# Patient Record
Sex: FEMALE | Race: WHITE | NOT HISPANIC OR LATINO | URBAN - METROPOLITAN AREA
[De-identification: names, ages, dates, MRNs, and addresses within clinical notes are randomized per-mention and may not be internally consistent; named-entity substitution may affect disease eponyms.]

---

## 2017-02-06 ENCOUNTER — OFFICE (AMBULATORY)
Dept: URBAN - METROPOLITAN AREA CLINIC 75 | Facility: CLINIC | Age: 62
End: 2017-02-06

## 2017-02-06 VITALS
DIASTOLIC BLOOD PRESSURE: 68 MMHG | HEIGHT: 62 IN | WEIGHT: 147 LBS | HEART RATE: 86 BPM | SYSTOLIC BLOOD PRESSURE: 104 MMHG

## 2017-02-06 DIAGNOSIS — K51.00 ULCERATIVE (CHRONIC) PANCOLITIS WITHOUT COMPLICATIONS: ICD-10-CM

## 2017-02-06 DIAGNOSIS — Z83.71 FAMILY HISTORY OF COLONIC POLYPS: ICD-10-CM

## 2017-02-06 DIAGNOSIS — K21.9 GASTRO-ESOPHAGEAL REFLUX DISEASE WITHOUT ESOPHAGITIS: ICD-10-CM

## 2017-02-06 DIAGNOSIS — K58.0 IRRITABLE BOWEL SYNDROME WITH DIARRHEA: ICD-10-CM

## 2017-02-06 DIAGNOSIS — K57.30 DIVERTICULOSIS OF LARGE INTESTINE WITHOUT PERFORATION OR ABS: ICD-10-CM

## 2017-02-06 DIAGNOSIS — Z80.0 FAMILY HISTORY OF MALIGNANT NEOPLASM OF DIGESTIVE ORGANS: ICD-10-CM

## 2017-02-06 PROCEDURE — 99214 OFFICE O/P EST MOD 30 MIN: CPT | Performed by: INTERNAL MEDICINE

## 2017-06-26 ENCOUNTER — OFFICE (AMBULATORY)
Dept: URBAN - METROPOLITAN AREA CLINIC 75 | Facility: CLINIC | Age: 62
End: 2017-06-26

## 2017-06-26 VITALS
WEIGHT: 151 LBS | SYSTOLIC BLOOD PRESSURE: 116 MMHG | HEIGHT: 62 IN | HEART RATE: 100 BPM | DIASTOLIC BLOOD PRESSURE: 72 MMHG

## 2017-06-26 DIAGNOSIS — K57.30 DIVERTICULOSIS OF LARGE INTESTINE WITHOUT PERFORATION OR ABS: ICD-10-CM

## 2017-06-26 DIAGNOSIS — Z83.71 FAMILY HISTORY OF COLONIC POLYPS: ICD-10-CM

## 2017-06-26 DIAGNOSIS — K51.00 ULCERATIVE (CHRONIC) PANCOLITIS WITHOUT COMPLICATIONS: ICD-10-CM

## 2017-06-26 DIAGNOSIS — K21.9 GASTRO-ESOPHAGEAL REFLUX DISEASE WITHOUT ESOPHAGITIS: ICD-10-CM

## 2017-06-26 DIAGNOSIS — K58.0 IRRITABLE BOWEL SYNDROME WITH DIARRHEA: ICD-10-CM

## 2017-06-26 DIAGNOSIS — Z80.0 FAMILY HISTORY OF MALIGNANT NEOPLASM OF DIGESTIVE ORGANS: ICD-10-CM

## 2017-06-26 DIAGNOSIS — R19.4 CHANGE IN BOWEL HABIT: ICD-10-CM

## 2017-06-26 PROCEDURE — 99214 OFFICE O/P EST MOD 30 MIN: CPT | Performed by: INTERNAL MEDICINE

## 2017-12-07 ENCOUNTER — OFFICE VISIT (OUTPATIENT)
Dept: OBSTETRICS AND GYNECOLOGY | Age: 62
End: 2017-12-07

## 2017-12-07 VITALS
WEIGHT: 133.2 LBS | HEIGHT: 63 IN | SYSTOLIC BLOOD PRESSURE: 118 MMHG | DIASTOLIC BLOOD PRESSURE: 70 MMHG | BODY MASS INDEX: 23.6 KG/M2

## 2017-12-07 DIAGNOSIS — E78.00 HYPERCHOLESTEREMIA: Primary | ICD-10-CM

## 2017-12-07 DIAGNOSIS — Z00.00 ANNUAL PHYSICAL EXAM: ICD-10-CM

## 2017-12-07 DIAGNOSIS — K21.9 GASTROESOPHAGEAL REFLUX DISEASE, ESOPHAGITIS PRESENCE NOT SPECIFIED: ICD-10-CM

## 2017-12-07 PROCEDURE — 99396 PREV VISIT EST AGE 40-64: CPT | Performed by: OBSTETRICS & GYNECOLOGY

## 2017-12-07 RX ORDER — LIFITEGRAST 50 MG/ML
SOLUTION/ DROPS OPHTHALMIC
Refills: 3 | COMMUNITY
Start: 2017-11-29 | End: 2020-10-19

## 2017-12-07 RX ORDER — DULOXETIN HYDROCHLORIDE 60 MG/1
CAPSULE, DELAYED RELEASE ORAL
Refills: 3 | COMMUNITY
Start: 2017-10-17

## 2017-12-07 RX ORDER — MESALAMINE 1.2 G/1
TABLET, DELAYED RELEASE ORAL
COMMUNITY
Start: 2017-12-05 | End: 2019-09-13

## 2017-12-07 RX ORDER — PANTOPRAZOLE SODIUM 40 MG/1
TABLET, DELAYED RELEASE ORAL
Refills: 3 | COMMUNITY
Start: 2017-10-29 | End: 2021-01-26

## 2017-12-07 RX ORDER — INFLUENZA A VIRUS A/SINGAPORE/GP1908/2015 IVR-180 (H1N1) ANTIGEN (MDCK CELL DERIVED, PROPIOLACTONE INACTIVATED), INFLUENZA A VIRUS A/NORTH CAROLINA/04/2016 (H3N2) HEMAGGLUTININ ANTIGEN (MDCK CELL DERIVED, PROPIOLACTONE INACTIVATED), INFLUENZA B VIRUS B/IOWA/06/2017 HEMAGGLUTININ ANTIGEN (MDCK CELL DERIVED, PROPIOLACTONE INACTIVATED), INFLUENZA B VIRUS B/SINGAPORE/INFTT-16-0610/2016 HEMAGGLUTININ ANTIGEN (MDCK CELL DERIVED, PROPIOLACTONE INACTIVATED) 15; 15; 15; 15 UG/.5ML; UG/.5ML; UG/.5ML; UG/.5ML
INJECTION, SUSPENSION INTRAMUSCULAR
Refills: 0 | COMMUNITY
Start: 2017-11-03 | End: 2019-09-13

## 2017-12-07 RX ORDER — SIMVASTATIN 20 MG
TABLET ORAL
Refills: 2 | COMMUNITY
Start: 2017-09-10 | End: 2019-09-13 | Stop reason: SDUPTHER

## 2017-12-07 RX ORDER — ARIPIPRAZOLE 5 MG/1
TABLET ORAL
Refills: 5 | COMMUNITY
Start: 2017-11-30

## 2017-12-07 NOTE — PROGRESS NOTES
Subjective   Cara Maharaj is a 62 y.o. female is being seen today for an annual exam.  Chief Complaint   Patient presents with   • Gynecologic Exam   .    History of Present Illness  Patient is here for an annual check.  The Carmelina year and half she doing well at the pill problems but does have flareups of colitis every so often.  Interestingly during last year and this year she gained about 24 pounds and lost about 24 pounds all because of eminence.  There is no new family history still has 2 grandchildren and 1 great grandchild.  She had cataracts performed this year they didn't help so the did laser in another procedure but she's actually no better may be little bit worse than before anything was done at all.  Otherwise no other complaints  The following portions of the patient's history were reviewed and updated as appropriate: allergies, current medications, past family history, past medical history, past social history, past surgical history and problem list.    Vitals:    17 1407   BP: 118/70       PAST MEDICAL HISTORY  History reviewed. No pertinent past medical history.  OB History      Para Term  AB Living    2 2 2       SAB TAB Ectopic Multiple Live Births                History reviewed. No pertinent surgical history.  Family History   Problem Relation Age of Onset   • No Known Problems Mother    • No Known Problems Father    • Breast cancer Sister    • No Known Problems Brother    • No Known Problems Daughter    • No Known Problems Son    • No Known Problems Maternal Grandmother    • No Known Problems Paternal Grandmother    • No Known Problems Maternal Aunt    • No Known Problems Paternal Aunt    • BRCA 1/2 Neg Hx    • Colon cancer Neg Hx    • Endometrial cancer Neg Hx    • Ovarian cancer Neg Hx      History   Smoking Status   • Never Smoker   Smokeless Tobacco   • Never Used       Current Outpatient Prescriptions:   •  ARIPiprazole (ABILIFY) 5 MG tablet, TK 1 T PO D, Disp: , Rfl:  5  •  DULoxetine (CYMBALTA) 60 MG capsule, TK 2 CS PO ONCE A DAY, Disp: , Rfl: 3  •  FLUCELVAX QUADRIVALENT 0.5 ML suspension prefilled syringe injection, ADM 0.5ML IM UTD, Disp: , Rfl: 0  •  LIALDA 1.2 g EC tablet, , Disp: , Rfl:   •  pantoprazole (PROTONIX) 40 MG EC tablet, TK 1 T PO  D, Disp: , Rfl: 3  •  RESTASIS MULTIDOSE 0.05 % ophthalmic emulsion, PLACE ONE GTT INTO BOTH EYES BID, Disp: , Rfl: 6  •  simvastatin (ZOCOR) 20 MG tablet, TK 1 T PO QD, Disp: , Rfl: 2  •  XIIDRA 5 % solution, INSTILL ONE DROP INTO BOTH EYES  TWICE DAILY, Disp: , Rfl: 3    There is no immunization history on file for this patient.    Review of Systems   Constitutional: Negative for chills, fatigue, fever and unexpected weight change.   Respiratory: Negative for shortness of breath and wheezing.    Cardiovascular: Negative for chest pain.   Gastrointestinal: Negative for abdominal distention, abdominal pain, blood in stool, constipation, diarrhea and nausea.   Genitourinary: Negative for difficulty urinating, dyspareunia, dysuria, frequency, hematuria, menstrual problem, pelvic pain, urgency and vaginal discharge.   Skin: Negative for rash.       Objective   Physical Exam   Constitutional: She is oriented to person, place, and time. Vital signs are normal. She appears well-developed and well-nourished.   Neck: No thyromegaly present.   Cardiovascular: Normal rate, regular rhythm and normal heart sounds.    Pulmonary/Chest: Effort normal. Right breast exhibits no inverted nipple, no mass, no nipple discharge, no skin change and no tenderness. Left breast exhibits no inverted nipple, no mass, no nipple discharge, no skin change and no tenderness. Breasts are symmetrical. There is no breast swelling.   Abdominal: Soft.   Genitourinary: Vagina normal and uterus normal. No breast tenderness, discharge or bleeding. Pelvic exam was performed with patient supine. No labial fusion. There is no rash, tenderness, lesion or injury on the right  labia. There is no rash, tenderness, lesion or injury on the left labia. Cervix exhibits no motion tenderness, no discharge and no friability. Right adnexum displays no mass, no tenderness and no fullness. Left adnexum displays no mass, no tenderness and no fullness.   Neurological: She is alert and oriented to person, place, and time.   Psychiatric: She has a normal mood and affect.   Vitals reviewed.        Assessment/Plan   Cara was seen today for gynecologic exam.    Diagnoses and all orders for this visit:    Hypercholesteremia    Gastroesophageal reflux disease, esophagitis presence not specified    Annual physical exam    Normal exam today.  Pap smear is done last year.  Mammogram will be done today.  Colonoscopy was 14 good for 5 years and a bone density was also last year.  Diet and excise were discussed come back in year

## 2019-04-22 VITALS
HEART RATE: 74 BPM | TEMPERATURE: 97.4 F | SYSTOLIC BLOOD PRESSURE: 110 MMHG | DIASTOLIC BLOOD PRESSURE: 56 MMHG | HEART RATE: 83 BPM | SYSTOLIC BLOOD PRESSURE: 108 MMHG | SYSTOLIC BLOOD PRESSURE: 74 MMHG | TEMPERATURE: 97.2 F | DIASTOLIC BLOOD PRESSURE: 78 MMHG | HEIGHT: 63 IN | RESPIRATION RATE: 16 BRPM | DIASTOLIC BLOOD PRESSURE: 77 MMHG | HEART RATE: 85 BPM | DIASTOLIC BLOOD PRESSURE: 61 MMHG | SYSTOLIC BLOOD PRESSURE: 97 MMHG | SYSTOLIC BLOOD PRESSURE: 99 MMHG | RESPIRATION RATE: 31 BRPM | DIASTOLIC BLOOD PRESSURE: 69 MMHG | RESPIRATION RATE: 18 BRPM | RESPIRATION RATE: 22 BRPM | SYSTOLIC BLOOD PRESSURE: 107 MMHG | DIASTOLIC BLOOD PRESSURE: 65 MMHG | DIASTOLIC BLOOD PRESSURE: 73 MMHG | RESPIRATION RATE: 21 BRPM | SYSTOLIC BLOOD PRESSURE: 77 MMHG | SYSTOLIC BLOOD PRESSURE: 114 MMHG | SYSTOLIC BLOOD PRESSURE: 117 MMHG | HEART RATE: 67 BPM | HEART RATE: 82 BPM | DIASTOLIC BLOOD PRESSURE: 36 MMHG | DIASTOLIC BLOOD PRESSURE: 68 MMHG | HEART RATE: 77 BPM | DIASTOLIC BLOOD PRESSURE: 54 MMHG | SYSTOLIC BLOOD PRESSURE: 121 MMHG | RESPIRATION RATE: 23 BRPM | HEART RATE: 75 BPM | WEIGHT: 130 LBS | RESPIRATION RATE: 30 BRPM | HEART RATE: 88 BPM | OXYGEN SATURATION: 98 % | RESPIRATION RATE: 20 BRPM | RESPIRATION RATE: 27 BRPM | HEART RATE: 86 BPM | HEART RATE: 72 BPM | DIASTOLIC BLOOD PRESSURE: 62 MMHG | SYSTOLIC BLOOD PRESSURE: 103 MMHG | HEART RATE: 78 BPM | OXYGEN SATURATION: 100 % | OXYGEN SATURATION: 99 % | OXYGEN SATURATION: 97 % | SYSTOLIC BLOOD PRESSURE: 113 MMHG

## 2019-04-23 ENCOUNTER — OFFICE (AMBULATORY)
Dept: URBAN - METROPOLITAN AREA PATHOLOGY 4 | Facility: PATHOLOGY | Age: 64
End: 2019-04-23
Payer: COMMERCIAL

## 2019-04-23 ENCOUNTER — AMBULATORY SURGICAL CENTER (AMBULATORY)
Dept: URBAN - METROPOLITAN AREA SURGERY 17 | Facility: SURGERY | Age: 64
End: 2019-04-23
Payer: COMMERCIAL

## 2019-04-23 DIAGNOSIS — K64.8 OTHER HEMORRHOIDS: ICD-10-CM

## 2019-04-23 DIAGNOSIS — K52.831 COLLAGENOUS COLITIS: ICD-10-CM

## 2019-04-23 DIAGNOSIS — Z83.71 FAMILY HISTORY OF COLONIC POLYPS: ICD-10-CM

## 2019-04-23 DIAGNOSIS — K52.832 LYMPHOCYTIC COLITIS: ICD-10-CM

## 2019-04-23 DIAGNOSIS — K51.00 ULCERATIVE (CHRONIC) PANCOLITIS WITHOUT COMPLICATIONS: ICD-10-CM

## 2019-04-23 DIAGNOSIS — Z80.0 FAMILY HISTORY OF MALIGNANT NEOPLASM OF DIGESTIVE ORGANS: ICD-10-CM

## 2019-04-23 LAB
GI HISTOLOGY: A. UNSPECIFIED: (no result)
GI HISTOLOGY: B. SELECT: (no result)
GI HISTOLOGY: C. SELECT: (no result)
GI HISTOLOGY: PDF REPORT: (no result)

## 2019-04-23 PROCEDURE — 45380 COLONOSCOPY AND BIOPSY: CPT | Mod: 33 | Performed by: INTERNAL MEDICINE

## 2019-04-23 PROCEDURE — 88305 TISSUE EXAM BY PATHOLOGIST: CPT | Mod: 33 | Performed by: INTERNAL MEDICINE

## 2019-04-23 NOTE — SERVICEHPINOTES
Pleasant 63-year-old white female with a family history of polyps. Several siblings had polyps in their 50s.  She's also had a history of diarrhea and previous biopsies suggested possible chronic colitis. Her last colonoscopy was 5 years ago. She presents for a follow-up exam.

## 2019-07-30 ENCOUNTER — OFFICE (AMBULATORY)
Dept: URBAN - METROPOLITAN AREA CLINIC 75 | Facility: CLINIC | Age: 64
End: 2019-07-30

## 2019-07-30 VITALS
DIASTOLIC BLOOD PRESSURE: 66 MMHG | RESPIRATION RATE: 16 BRPM | SYSTOLIC BLOOD PRESSURE: 122 MMHG | WEIGHT: 154 LBS | HEIGHT: 63 IN | HEART RATE: 74 BPM

## 2019-07-30 DIAGNOSIS — Z83.71 FAMILY HISTORY OF COLONIC POLYPS: ICD-10-CM

## 2019-07-30 DIAGNOSIS — K58.0 IRRITABLE BOWEL SYNDROME WITH DIARRHEA: ICD-10-CM

## 2019-07-30 DIAGNOSIS — K51.00 ULCERATIVE (CHRONIC) PANCOLITIS WITHOUT COMPLICATIONS: ICD-10-CM

## 2019-07-30 DIAGNOSIS — K21.9 GASTRO-ESOPHAGEAL REFLUX DISEASE WITHOUT ESOPHAGITIS: ICD-10-CM

## 2019-07-30 DIAGNOSIS — Z80.0 FAMILY HISTORY OF MALIGNANT NEOPLASM OF DIGESTIVE ORGANS: ICD-10-CM

## 2019-07-30 DIAGNOSIS — K57.30 DIVERTICULOSIS OF LARGE INTESTINE WITHOUT PERFORATION OR ABS: ICD-10-CM

## 2019-07-30 DIAGNOSIS — R19.4 CHANGE IN BOWEL HABIT: ICD-10-CM

## 2019-07-30 PROCEDURE — 99214 OFFICE O/P EST MOD 30 MIN: CPT | Performed by: NURSE PRACTITIONER

## 2019-07-30 RX ORDER — PANTOPRAZOLE SODIUM 40 MG/1
TABLET, DELAYED RELEASE ORAL
Qty: 90 | Refills: 4 | Status: COMPLETED
End: 2020-01-15

## 2019-07-30 RX ORDER — BUDESONIDE 3 MG/1
6 CAPSULE ORAL
Qty: 90 | Refills: 2 | Status: COMPLETED
End: 2020-09-28

## 2019-08-23 VITALS
HEART RATE: 96 BPM | HEIGHT: 63 IN | HEART RATE: 82 BPM | DIASTOLIC BLOOD PRESSURE: 68 MMHG | RESPIRATION RATE: 14 BRPM | OXYGEN SATURATION: 100 % | SYSTOLIC BLOOD PRESSURE: 125 MMHG | DIASTOLIC BLOOD PRESSURE: 91 MMHG | RESPIRATION RATE: 9 BRPM | RESPIRATION RATE: 13 BRPM | OXYGEN SATURATION: 99 % | HEART RATE: 69 BPM | DIASTOLIC BLOOD PRESSURE: 52 MMHG | OXYGEN SATURATION: 97 % | SYSTOLIC BLOOD PRESSURE: 133 MMHG | WEIGHT: 154 LBS | DIASTOLIC BLOOD PRESSURE: 75 MMHG | SYSTOLIC BLOOD PRESSURE: 124 MMHG | OXYGEN SATURATION: 95 % | TEMPERATURE: 96.7 F | HEART RATE: 83 BPM | RESPIRATION RATE: 16 BRPM | SYSTOLIC BLOOD PRESSURE: 102 MMHG

## 2019-08-27 ENCOUNTER — AMBULATORY SURGICAL CENTER (AMBULATORY)
Dept: URBAN - METROPOLITAN AREA SURGERY 17 | Facility: SURGERY | Age: 64
End: 2019-08-27

## 2019-08-27 ENCOUNTER — OFFICE (AMBULATORY)
Dept: URBAN - METROPOLITAN AREA PATHOLOGY 4 | Facility: PATHOLOGY | Age: 64
End: 2019-08-27

## 2019-08-27 DIAGNOSIS — K21.9 GASTRO-ESOPHAGEAL REFLUX DISEASE WITHOUT ESOPHAGITIS: ICD-10-CM

## 2019-08-27 DIAGNOSIS — K22.70 BARRETT'S ESOPHAGUS WITHOUT DYSPLASIA: ICD-10-CM

## 2019-08-27 DIAGNOSIS — K31.7 POLYP OF STOMACH AND DUODENUM: ICD-10-CM

## 2019-08-27 DIAGNOSIS — K29.70 GASTRITIS, UNSPECIFIED, WITHOUT BLEEDING: ICD-10-CM

## 2019-08-27 DIAGNOSIS — K44.9 DIAPHRAGMATIC HERNIA WITHOUT OBSTRUCTION OR GANGRENE: ICD-10-CM

## 2019-08-27 DIAGNOSIS — K31.89 OTHER DISEASES OF STOMACH AND DUODENUM: ICD-10-CM

## 2019-08-27 DIAGNOSIS — K22.2 ESOPHAGEAL OBSTRUCTION: ICD-10-CM

## 2019-08-27 LAB
GI HISTOLOGY: A. SELECT: (no result)
GI HISTOLOGY: B. SELECT: (no result)
GI HISTOLOGY: C. SELECT: (no result)
GI HISTOLOGY: PDF REPORT: (no result)

## 2019-08-27 PROCEDURE — 43239 EGD BIOPSY SINGLE/MULTIPLE: CPT | Performed by: INTERNAL MEDICINE

## 2019-08-27 PROCEDURE — 88305 TISSUE EXAM BY PATHOLOGIST: CPT | Performed by: INTERNAL MEDICINE

## 2019-08-27 NOTE — SERVICEHPINOTES
I had the pleasure of seeing Ms. Urbina in our gastroenterology office for a follow-up visit. As you know, she is a very pleasant 64-year-old  female patient of Dr. Pena with a history of GERD with esophagitis, IBS, microscopic colitis and colon polyps. She was last seen in our office 4/23/2019 and bypass capacity when she underwent surveillance colonoscopy, which showed lymphocytic colitis... Started budesonide 3 mg tablets, 3 pills once daily. She presents today for follow-up.At present, she reports that she is having 3-4 BMs per day that are formed. She will have some diarrhea 1-2x/weeks. She reports that within 2 days of taking budesonide, she started having symptom improvement. She denies blood in her stool, melena, unexplained weight loss, constipation, rectal pain/itching/burning, rectal protrusions, fecal leakage or incontinence.She has a history of reflux and is taking pantoprazole 40mg once daily with good control of her symptoms. Last EGD 1/14/2009 showed reflux esophagitis and benign esophageal stricture. She denies dysphagia, odynophagia, heartburn, reflux, nausea, vomiting, abdominal pain, bloating or distention. Tried: pantoprazole (working well for > 10 years) DATA REVIEWED:BREGD 1/14/2009 showed grade B reflux esophagitis benign appearing esophageal stricture HH gastritis and duodenitis single small nodule in stomach area. Pathology revealed gastritis negative for H. pylori and celiac sprue gastric polyp was found to be hyperplastic.BRCN 4/23/2019 IH. Pathology revealed lymphocytic colitis in the right and left colon.

## 2019-09-05 DIAGNOSIS — E78.00 HYPERCHOLESTEREMIA: Primary | ICD-10-CM

## 2019-09-05 DIAGNOSIS — Z00.00 WELLNESS EXAMINATION: ICD-10-CM

## 2019-09-06 LAB
ALBUMIN SERPL-MCNC: 4.4 G/DL (ref 3.5–5.2)
ALBUMIN/GLOB SERPL: 1.9 G/DL
ALP SERPL-CCNC: 64 U/L (ref 39–117)
ALT SERPL-CCNC: 19 U/L (ref 1–33)
AST SERPL-CCNC: 20 U/L (ref 1–32)
BILIRUB SERPL-MCNC: 0.3 MG/DL (ref 0.2–1.2)
BUN SERPL-MCNC: 15 MG/DL (ref 8–23)
BUN/CREAT SERPL: 17.4 (ref 7–25)
CALCIUM SERPL-MCNC: 9.3 MG/DL (ref 8.6–10.5)
CHLORIDE SERPL-SCNC: 103 MMOL/L (ref 98–107)
CHOLEST SERPL-MCNC: 235 MG/DL (ref 0–200)
CHOLEST/HDLC SERPL: 2.03 {RATIO}
CO2 SERPL-SCNC: 30.5 MMOL/L (ref 22–29)
CREAT SERPL-MCNC: 0.86 MG/DL (ref 0.57–1)
GLOBULIN SER CALC-MCNC: 2.3 GM/DL
GLUCOSE SERPL-MCNC: 99 MG/DL (ref 65–99)
HDLC SERPL-MCNC: 116 MG/DL (ref 40–60)
LDLC SERPL CALC-MCNC: 105 MG/DL (ref 0–100)
POTASSIUM SERPL-SCNC: 4.4 MMOL/L (ref 3.5–5.2)
PROT SERPL-MCNC: 6.7 G/DL (ref 6–8.5)
SODIUM SERPL-SCNC: 144 MMOL/L (ref 136–145)
TRIGL SERPL-MCNC: 68 MG/DL (ref 0–150)
VLDLC SERPL CALC-MCNC: 13.6 MG/DL

## 2019-09-13 ENCOUNTER — OFFICE VISIT (OUTPATIENT)
Dept: INTERNAL MEDICINE | Facility: CLINIC | Age: 64
End: 2019-09-13

## 2019-09-13 VITALS
BODY MASS INDEX: 29.44 KG/M2 | SYSTOLIC BLOOD PRESSURE: 112 MMHG | HEIGHT: 62 IN | DIASTOLIC BLOOD PRESSURE: 76 MMHG | WEIGHT: 160 LBS

## 2019-09-13 DIAGNOSIS — K21.9 GASTROESOPHAGEAL REFLUX DISEASE, ESOPHAGITIS PRESENCE NOT SPECIFIED: ICD-10-CM

## 2019-09-13 DIAGNOSIS — Z00.00 WELLNESS EXAMINATION: Primary | ICD-10-CM

## 2019-09-13 DIAGNOSIS — E78.00 HYPERCHOLESTEREMIA: ICD-10-CM

## 2019-09-13 PROBLEM — K22.70 BARRETT'S ESOPHAGUS WITHOUT DYSPLASIA: Status: ACTIVE | Noted: 2019-09-13

## 2019-09-13 PROBLEM — K52.832 LYMPHOCYTIC COLITIS: Status: ACTIVE | Noted: 2019-09-13

## 2019-09-13 PROBLEM — I34.1 MITRAL VALVE PROLAPSE: Status: ACTIVE | Noted: 2019-09-13

## 2019-09-13 PROBLEM — F10.10 ALCOHOL ABUSE: Status: ACTIVE | Noted: 2019-09-13

## 2019-09-13 PROBLEM — A63.0 GENITAL WARTS: Status: RESOLVED | Noted: 2019-09-13 | Resolved: 2019-09-13

## 2019-09-13 PROBLEM — F10.10 ALCOHOL ABUSE: Status: RESOLVED | Noted: 2019-09-13 | Resolved: 2019-09-13

## 2019-09-13 PROBLEM — A63.0 GENITAL WARTS: Status: ACTIVE | Noted: 2019-09-13

## 2019-09-13 PROBLEM — I34.1 MITRAL VALVE PROLAPSE: Status: RESOLVED | Noted: 2019-09-13 | Resolved: 2019-09-13

## 2019-09-13 PROCEDURE — 99397 PER PM REEVAL EST PAT 65+ YR: CPT | Performed by: INTERNAL MEDICINE

## 2019-09-13 PROCEDURE — 90471 IMMUNIZATION ADMIN: CPT | Performed by: INTERNAL MEDICINE

## 2019-09-13 PROCEDURE — 90653 IIV ADJUVANT VACCINE IM: CPT | Performed by: INTERNAL MEDICINE

## 2019-09-13 RX ORDER — BUDESONIDE 3 MG/1
CAPSULE, COATED PELLETS ORAL
Refills: 3 | COMMUNITY
Start: 2019-07-27

## 2019-09-13 RX ORDER — SIMVASTATIN 20 MG
20 TABLET ORAL DAILY
Qty: 90 TABLET | Refills: 2 | Status: SHIPPED | OUTPATIENT
Start: 2019-09-13 | End: 2020-10-19 | Stop reason: SDUPTHER

## 2019-10-22 ENCOUNTER — TELEPHONE (OUTPATIENT)
Dept: INTERNAL MEDICINE | Facility: CLINIC | Age: 64
End: 2019-10-22

## 2019-10-22 ENCOUNTER — OFFICE VISIT (OUTPATIENT)
Dept: INTERNAL MEDICINE | Facility: CLINIC | Age: 64
End: 2019-10-22

## 2019-10-22 ENCOUNTER — HOSPITAL ENCOUNTER (OUTPATIENT)
Dept: GENERAL RADIOLOGY | Facility: HOSPITAL | Age: 64
Discharge: HOME | End: 2019-10-22
Admitting: PHYSICIAN ASSISTANT

## 2019-10-22 VITALS
DIASTOLIC BLOOD PRESSURE: 70 MMHG | BODY MASS INDEX: 30.36 KG/M2 | HEIGHT: 62 IN | WEIGHT: 165 LBS | SYSTOLIC BLOOD PRESSURE: 110 MMHG

## 2019-10-22 DIAGNOSIS — R07.81 RIB PAIN ON LEFT SIDE: Primary | ICD-10-CM

## 2019-10-22 PROCEDURE — 71101 X-RAY EXAM UNILAT RIBS/CHEST: CPT

## 2019-10-22 PROCEDURE — 99213 OFFICE O/P EST LOW 20 MIN: CPT | Performed by: PHYSICIAN ASSISTANT

## 2019-10-22 NOTE — PROGRESS NOTES
Subjective   Chief Complaint   Patient presents with   • Fall     left ribs and left breast sore       History of Present Illness     She fell a little over a month ago while walking her dogs and landed on her left arm and left side. She had bruising down her arm which is now resolved. She is  over her lateral left ribs just under her left breast. She is taking tylenol 1000 mg every 8 hours currently which helps. Her breathing is good, but discomfort when taking a deep breath.      Patient Active Problem List   Diagnosis   • Hypercholesteremia   • Gastroesophageal reflux disease   • Jones's esophagus without dysplasia   • Lymphocytic colitis   • Wellness examination       No Known Allergies    Current Outpatient Medications on File Prior to Visit   Medication Sig Dispense Refill   • ARIPiprazole (ABILIFY) 5 MG tablet TK 1 T PO D  5   • DULoxetine (CYMBALTA) 60 MG capsule TK 2 CS PO ONCE A DAY  3   • pantoprazole (PROTONIX) 40 MG EC tablet TK 1 T PO  D  3   • simvastatin (ZOCOR) 20 MG tablet Take 1 tablet by mouth Daily. 90 tablet 2   • XIIDRA 5 % solution INSTILL ONE DROP INTO BOTH EYES  TWICE DAILY  3   • Budesonide (ENTOCORT EC) 3 MG 24 hr capsule 1 tab po daily  3     No current facility-administered medications on file prior to visit.        Past Medical History:   Diagnosis Date   • Alcohol abuse    • Allergic rhinitis    • Anxiety    • Cataract    • Depression    • Osteopenia    • Rosacea    • Ulcerative colitis (CMS/HCC)        Family History   Problem Relation Age of Onset   • Lung cancer Mother    • Stroke Father    • Lung cancer Father    • Depression Father    • Cancer Father         melanoma   • Breast cancer Sister    • Asthma Sister    • Ulcerative colitis Sister    • No Known Problems Brother    • No Known Problems Daughter    • No Known Problems Son    • No Known Problems Maternal Grandmother    • No Known Problems Paternal Grandmother    • No Known Problems Maternal Aunt    • No Known  "Problems Paternal Aunt    • BRCA 1/2 Neg Hx    • Colon cancer Neg Hx    • Endometrial cancer Neg Hx    • Ovarian cancer Neg Hx        Social History     Socioeconomic History   • Marital status:      Spouse name: Not on file   • Number of children: Not on file   • Years of education: Not on file   • Highest education level: Not on file   Tobacco Use   • Smoking status: Never Smoker   • Smokeless tobacco: Never Used   Substance and Sexual Activity   • Alcohol use: No     Frequency: Never   • Drug use: No   • Sexual activity: Defer       Past Surgical History:   Procedure Laterality Date   • COLONOSCOPY  04/23/2019   • SHOULDER SURGERY     • TUBAL ABDOMINAL LIGATION             The following portions of the patient's history were reviewed and updated as appropriate: problem list, allergies, current medications and past medical history.    Review of Systems   Respiratory: Negative for shortness of breath.    Musculoskeletal:        Left rib pain       Immunization History   Administered Date(s) Administered   • Fluad Quad 09/13/2019   • Hepatitis A 05/22/2018, 01/22/2019   • Tdap 09/03/2014   • Zostavax 01/01/2011       Objective   Vitals:    10/22/19 1434 10/22/19 1443   BP:  110/70   Weight: 74.8 kg (165 lb)    Height: 157.5 cm (62.02\")      Physical Exam   Constitutional: She appears well-developed and well-nourished.   Cardiovascular: Normal rate and regular rhythm.   Pulmonary/Chest: Effort normal and breath sounds normal.   Tenderness over left lateral ribs to palpation.          Assessment/Plan   Cara was seen today for fall.    Diagnoses and all orders for this visit:    Rib pain on left side  -     XR Ribs Left With PA Chest    Will get xrays today. Continue tylenol and may take aleve prn as well. Lungs are CTA and equal bilaterally no diminished breath sounds.          "

## 2019-10-22 NOTE — TELEPHONE ENCOUNTER
----- Message from Estephanie Almanza PA-C sent at 10/22/2019  4:34 PM EDT -----  No rib fracutres present.

## 2019-12-13 ENCOUNTER — OFFICE (AMBULATORY)
Dept: URBAN - METROPOLITAN AREA CLINIC 75 | Facility: CLINIC | Age: 64
End: 2019-12-13

## 2019-12-13 VITALS
HEART RATE: 108 BPM | SYSTOLIC BLOOD PRESSURE: 130 MMHG | WEIGHT: 162 LBS | HEIGHT: 63 IN | DIASTOLIC BLOOD PRESSURE: 70 MMHG

## 2019-12-13 DIAGNOSIS — Z83.71 FAMILY HISTORY OF COLONIC POLYPS: ICD-10-CM

## 2019-12-13 DIAGNOSIS — K21.9 GASTRO-ESOPHAGEAL REFLUX DISEASE WITHOUT ESOPHAGITIS: ICD-10-CM

## 2019-12-13 DIAGNOSIS — Z80.0 FAMILY HISTORY OF MALIGNANT NEOPLASM OF DIGESTIVE ORGANS: ICD-10-CM

## 2019-12-13 DIAGNOSIS — K52.9 NONINFECTIVE GASTROENTERITIS AND COLITIS, UNSPECIFIED: ICD-10-CM

## 2019-12-13 DIAGNOSIS — K22.70 BARRETT'S ESOPHAGUS WITHOUT DYSPLASIA: ICD-10-CM

## 2019-12-13 PROCEDURE — 99214 OFFICE O/P EST MOD 30 MIN: CPT | Performed by: NURSE PRACTITIONER

## 2020-04-03 ENCOUNTER — TELEMEDICINE (OUTPATIENT)
Dept: INTERNAL MEDICINE | Facility: CLINIC | Age: 65
End: 2020-04-03

## 2020-04-03 DIAGNOSIS — M25.511 ACUTE PAIN OF RIGHT SHOULDER: Primary | ICD-10-CM

## 2020-04-03 PROCEDURE — 99213 OFFICE O/P EST LOW 20 MIN: CPT | Performed by: INTERNAL MEDICINE

## 2020-04-03 NOTE — PROGRESS NOTES
Subjective        Chief Complaint   Patient presents with   • Shoulder Pain           Cara Maharaj is a 64 y.o. female who presents for    Patient Active Problem List   Diagnosis   • Hypercholesteremia   • Gastroesophageal reflux disease   • Jones's esophagus without dysplasia   • Lymphocytic colitis   • Wellness examination   • Acute pain of right shoulder       History of Present Illness     She fell walking the dogs. Her dogs jerked her and pulled her off her feet. She landed on her left side. She did not fall on her right arm but she was still holding on the leash of her two dogs when she fell.This happened yesterday. It feels better wearing a sling. The pain is all around her right shoulder. She has had right  shoulder surgery years ago; it was some type of scope. She does not think it was a rotator cuff. She has been taking Acetaminophen. She cannot take Ibuprofen b/c of colitis.  No Known Allergies    Current Outpatient Medications on File Prior to Visit   Medication Sig Dispense Refill   • ARIPiprazole (ABILIFY) 5 MG tablet TK 1 T PO D  5   • Budesonide (ENTOCORT EC) 3 MG 24 hr capsule 1 tab po daily  3   • DULoxetine (CYMBALTA) 60 MG capsule TK 2 CS PO ONCE A DAY  3   • pantoprazole (PROTONIX) 40 MG EC tablet TK 1 T PO  D  3   • simvastatin (ZOCOR) 20 MG tablet Take 1 tablet by mouth Daily. 90 tablet 2   • XIIDRA 5 % solution INSTILL ONE DROP INTO BOTH EYES  TWICE DAILY  3     No current facility-administered medications on file prior to visit.        Past Medical History:   Diagnosis Date   • Alcohol abuse    • Allergic rhinitis    • Anxiety    • Cataract    • Depression    • Osteopenia    • Rosacea    • Ulcerative colitis (CMS/HCC)        Past Surgical History:   Procedure Laterality Date   • COLONOSCOPY  04/23/2019   • SHOULDER SURGERY     • TUBAL ABDOMINAL LIGATION         Family History   Problem Relation Age of Onset   • Lung cancer Mother    • Stroke Father    • Lung cancer Father    • Depression  Father    • Cancer Father         melanoma   • Breast cancer Sister    • Asthma Sister    • Ulcerative colitis Sister    • No Known Problems Brother    • No Known Problems Daughter    • No Known Problems Son    • No Known Problems Maternal Grandmother    • No Known Problems Paternal Grandmother    • No Known Problems Maternal Aunt    • No Known Problems Paternal Aunt    • BRCA 1/2 Neg Hx    • Colon cancer Neg Hx    • Endometrial cancer Neg Hx    • Ovarian cancer Neg Hx        Social History     Socioeconomic History   • Marital status:      Spouse name: Not on file   • Number of children: Not on file   • Years of education: Not on file   • Highest education level: Not on file   Tobacco Use   • Smoking status: Never Smoker   • Smokeless tobacco: Never Used   Substance and Sexual Activity   • Alcohol use: No     Frequency: Never   • Drug use: No   • Sexual activity: Defer           The following portions of the patient's history were reviewed and updated as appropriate: problem list, allergies, current medications, past medical history, past family history, past social history and past surgical history.    Review of Systems   Musculoskeletal:        Shoulder pain       Immunization History   Administered Date(s) Administered   • Fluad Quad 09/13/2019   • Hepatitis A 05/22/2018, 01/22/2019   • Tdap 09/03/2014   • Zostavax 01/01/2011       Objective   There were no vitals filed for this visit.  There is no height or weight on file to calculate BMI.  Physical Exam   Constitutional: She appears well-developed and well-nourished.   Musculoskeletal:   Right shoulder decreased ROM with moving arm above head. She is able to move her arm across the front of her chest. Limited movement behind her back.   Neurological: She is alert.   Psychiatric: She has a normal mood and affect.       Procedures    Assessment/Plan   aCra was seen today for shoulder pain.    Diagnoses and all orders for this visit:    Acute pain of  right shoulder             Ice and tylenol. Discussed getting an xray and doing PT; we cannot do PT now with state closure with COVID 19. She wants to hold off on an xray and I think that is very reasonable. She will try to get out of the sling early next week. She will call for changes and I could get her into ortho. We did Face Time today as she had an issue with some type of plug in on her phone.    No follow-ups on file.

## 2020-06-10 VITALS — HEIGHT: 63 IN | WEIGHT: 140 LBS

## 2020-06-15 ENCOUNTER — TELEHEALTH PROVIDED OTHER THAN IN PATIENT'S HOME (AMBULATORY)
Dept: URBAN - METROPOLITAN AREA TELEHEALTH 6 | Facility: TELEHEALTH | Age: 65
End: 2020-06-15
Payer: COMMERCIAL

## 2020-06-15 DIAGNOSIS — R19.7 DIARRHEA, UNSPECIFIED: ICD-10-CM

## 2020-06-15 DIAGNOSIS — K58.0 IRRITABLE BOWEL SYNDROME WITH DIARRHEA: ICD-10-CM

## 2020-06-15 DIAGNOSIS — Z83.71 FAMILY HISTORY OF COLONIC POLYPS: ICD-10-CM

## 2020-06-15 DIAGNOSIS — K52.9 NONINFECTIVE GASTROENTERITIS AND COLITIS, UNSPECIFIED: ICD-10-CM

## 2020-06-15 DIAGNOSIS — Z80.0 FAMILY HISTORY OF MALIGNANT NEOPLASM OF DIGESTIVE ORGANS: ICD-10-CM

## 2020-06-15 DIAGNOSIS — R19.4 CHANGE IN BOWEL HABIT: ICD-10-CM

## 2020-06-15 DIAGNOSIS — K29.70 GASTRITIS, UNSPECIFIED, WITHOUT BLEEDING: ICD-10-CM

## 2020-06-15 DIAGNOSIS — K57.30 DIVERTICULOSIS OF LARGE INTESTINE WITHOUT PERFORATION OR ABS: ICD-10-CM

## 2020-06-15 DIAGNOSIS — K22.2 ESOPHAGEAL OBSTRUCTION: ICD-10-CM

## 2020-06-15 DIAGNOSIS — K21.9 GASTRO-ESOPHAGEAL REFLUX DISEASE WITHOUT ESOPHAGITIS: ICD-10-CM

## 2020-06-15 DIAGNOSIS — K22.70 BARRETT'S ESOPHAGUS WITHOUT DYSPLASIA: ICD-10-CM

## 2020-06-15 DIAGNOSIS — K44.9 DIAPHRAGMATIC HERNIA WITHOUT OBSTRUCTION OR GANGRENE: ICD-10-CM

## 2020-06-15 PROCEDURE — 99213 OFFICE O/P EST LOW 20 MIN: CPT | Mod: 95 | Performed by: NURSE PRACTITIONER

## 2020-06-15 RX ORDER — BUDESONIDE 3 MG/1
CAPSULE ORAL
Qty: 120 | Refills: 3 | Status: COMPLETED
Start: 2020-06-15 | End: 2020-09-28

## 2020-09-18 NOTE — PROGRESS NOTES
Subjective        Chief Complaint   Patient presents with   • Annual Exam     yearly exam review labs   • Hyperlipidemia           Cara Maharaj is a 64 y.o. female who presents for    Patient Active Problem List   Diagnosis   • Hypercholesteremia   • Gastroesophageal reflux disease   • Jones's esophagus without dysplasia   • Lymphocytic colitis   • Wellness examination       History of Present Illness     Dr. Johnson changed her Lialda to Budesonide for lymphocytic colitis. She has gained 25 pounds since the medication change. She does exercise daily; she walks her dogs 2.5 to 3 miles per daily. She is eating more. She is in the process of weaning the Entocort. Her diarrhea was 7-8 times per day and now it is 2 times per week. She sees psychiatry every 6 months. She is stable emotionally.  She sees derm yearly. She gets a MMG yearly; she will get it with Dr. Greco.  No Known Allergies    Current Outpatient Medications on File Prior to Visit   Medication Sig Dispense Refill   • ARIPiprazole (ABILIFY) 5 MG tablet TK 1 T PO D  5   • Budesonide (ENTOCORT EC) 3 MG 24 hr capsule 1 tab po daily  3   • DULoxetine (CYMBALTA) 60 MG capsule TK 2 CS PO ONCE A DAY  3   • pantoprazole (PROTONIX) 40 MG EC tablet TK 1 T PO  D  3   • XIIDRA 5 % solution INSTILL ONE DROP INTO BOTH EYES  TWICE DAILY  3   • [DISCONTINUED] simvastatin (ZOCOR) 20 MG tablet TK 1 T PO QD  2   • [DISCONTINUED] FLUCELVAX QUADRIVALENT 0.5 ML suspension prefilled syringe injection ADM 0.5ML IM UTD  0   • [DISCONTINUED] LIALDA 1.2 g EC tablet      • [DISCONTINUED] RESTASIS MULTIDOSE 0.05 % ophthalmic emulsion PLACE ONE GTT INTO BOTH EYES BID  6     No current facility-administered medications on file prior to visit.        Past Medical History:   Diagnosis Date   • Alcohol abuse    • Allergic rhinitis    • Anxiety    • Cataract    • Depression    • Osteopenia    • Rosacea    • Ulcerative colitis (CMS/[facility])        Past Surgical History:   Procedure Laterality  Date   • COLONOSCOPY  04/23/2019   • SHOULDER SURGERY     • TUBAL ABDOMINAL LIGATION         Family History   Problem Relation Age of Onset   • Lung cancer Mother    • Stroke Father    • Lung cancer Father    • Depression Father    • Cancer Father         melanoma   • Breast cancer Sister    • Asthma Sister    • Ulcerative colitis Sister    • No Known Problems Brother    • No Known Problems Daughter    • No Known Problems Son    • No Known Problems Maternal Grandmother    • No Known Problems Paternal Grandmother    • No Known Problems Maternal Aunt    • No Known Problems Paternal Aunt    • BRCA 1/2 Neg Hx    • Colon cancer Neg Hx    • Endometrial cancer Neg Hx    • Ovarian cancer Neg Hx        Social History     Socioeconomic History   • Marital status:      Spouse name: Not on file   • Number of children: Not on file   • Years of education: Not on file   • Highest education level: Not on file   Tobacco Use   • Smoking status: Never Smoker   • Smokeless tobacco: Never Used   Substance and Sexual Activity   • Alcohol use: No     Frequency: Never   • Drug use: No   • Sexual activity: Defer           The following portions of the patient's history were reviewed and updated as appropriate: problem list, allergies, current medications, past medical history, past family history, past social history and past surgical history.    Review of Systems   Constitutional: Positive for unexpected weight gain. Negative for chills, fever and unexpected weight loss.   HENT: Negative for postnasal drip and sore throat.    Eyes: Negative for blurred vision.   Respiratory: Negative for cough, shortness of breath and wheezing.    Cardiovascular: Negative for chest pain and leg swelling.   Gastrointestinal: Positive for diarrhea. Negative for abdominal pain, blood in stool, nausea, vomiting and GERD.   Endocrine: Negative for polyuria.   Genitourinary: Negative for dysuria, frequency and hematuria.   Musculoskeletal: Negative for  "gait problem.   Skin: Negative for rash.   Allergic/Immunologic: Negative for immunocompromised state.   Neurological: Negative for weakness.   Hematological: Does not bruise/bleed easily.   Psychiatric/Behavioral: Positive for depressed mood. The patient is not nervous/anxious.        Immunization History   Administered Date(s) Administered   • Hepatitis A 05/22/2018, 01/22/2019   • Tdap 09/03/2014   • Zostavax 01/01/2011       Objective   Vitals:    09/13/19 1526   BP: 112/76   Weight: 72.6 kg (160 lb)   Height: 157.5 cm (62\")     Physical Exam   Constitutional: She appears well-developed and well-nourished.   HENT:   Head: Normocephalic and atraumatic.   Mouth/Throat: Oropharynx is clear and moist.   Eyes: Conjunctivae and EOM are normal. Pupils are equal, round, and reactive to light.   Neck: Neck supple. Carotid bruit is not present. No thyromegaly present.   Cardiovascular: Normal rate, regular rhythm and normal heart sounds.   No murmur heard.  Pulmonary/Chest: Effort normal and breath sounds normal.   Abdominal: Soft. She exhibits no distension and no mass. There is no tenderness. There is no rebound.   Lymphadenopathy:     She has no cervical adenopathy.   Neurological: She is alert.   Skin: Skin is warm.   Psychiatric: She has a normal mood and affect.   Vitals reviewed.      Procedures    Assessment/Plan   Cara was seen today for annual exam and hyperlipidemia.    Diagnoses and all orders for this visit:    Wellness examination    Gastroesophageal reflux disease, esophagitis presence not specified    Hypercholesteremia  -     simvastatin (ZOCOR) 20 MG tablet; Take 1 tablet by mouth Daily.    Other orders  -     Fluad Quad >65 years ([id number])             Labs reviewed. She will call gyn re MMG and DEXA. We called [facility] and there is no record of pneumonia shots; she will go by there and check as she appears to need Prevnar. Reflux is stable and she will need an EGD in 3 years for her Barretts.  " Discussed Shingrix.    No Follow-up on file.   <<----- Click to add NO significant Past Surgical History

## 2020-09-28 ENCOUNTER — OFFICE (AMBULATORY)
Dept: URBAN - METROPOLITAN AREA CLINIC 75 | Facility: CLINIC | Age: 65
End: 2020-09-28

## 2020-09-28 VITALS — WEIGHT: 144 LBS | HEIGHT: 63 IN | RESPIRATION RATE: 15 BRPM

## 2020-09-28 DIAGNOSIS — K58.0 IRRITABLE BOWEL SYNDROME WITH DIARRHEA: ICD-10-CM

## 2020-09-28 DIAGNOSIS — K52.9 NONINFECTIVE GASTROENTERITIS AND COLITIS, UNSPECIFIED: ICD-10-CM

## 2020-09-28 DIAGNOSIS — K44.9 DIAPHRAGMATIC HERNIA WITHOUT OBSTRUCTION OR GANGRENE: ICD-10-CM

## 2020-09-28 DIAGNOSIS — R19.4 CHANGE IN BOWEL HABIT: ICD-10-CM

## 2020-09-28 DIAGNOSIS — K22.70 BARRETT'S ESOPHAGUS WITHOUT DYSPLASIA: ICD-10-CM

## 2020-09-28 DIAGNOSIS — K57.30 DIVERTICULOSIS OF LARGE INTESTINE WITHOUT PERFORATION OR ABS: ICD-10-CM

## 2020-09-28 DIAGNOSIS — R19.7 DIARRHEA, UNSPECIFIED: ICD-10-CM

## 2020-09-28 DIAGNOSIS — K29.70 GASTRITIS, UNSPECIFIED, WITHOUT BLEEDING: ICD-10-CM

## 2020-09-28 DIAGNOSIS — K21.9 GASTRO-ESOPHAGEAL REFLUX DISEASE WITHOUT ESOPHAGITIS: ICD-10-CM

## 2020-09-28 DIAGNOSIS — Z80.0 FAMILY HISTORY OF MALIGNANT NEOPLASM OF DIGESTIVE ORGANS: ICD-10-CM

## 2020-09-28 DIAGNOSIS — K22.2 ESOPHAGEAL OBSTRUCTION: ICD-10-CM

## 2020-09-28 DIAGNOSIS — Z83.71 FAMILY HISTORY OF COLONIC POLYPS: ICD-10-CM

## 2020-09-28 PROCEDURE — 99213 OFFICE O/P EST LOW 20 MIN: CPT | Performed by: INTERNAL MEDICINE

## 2020-10-07 DIAGNOSIS — E78.00 HYPERCHOLESTEREMIA: ICD-10-CM

## 2020-10-07 RX ORDER — SIMVASTATIN 20 MG
20 TABLET ORAL DAILY
Qty: 90 TABLET | Refills: 2 | OUTPATIENT
Start: 2020-10-07

## 2020-10-19 ENCOUNTER — OFFICE VISIT (OUTPATIENT)
Dept: INTERNAL MEDICINE | Facility: CLINIC | Age: 65
End: 2020-10-19

## 2020-10-19 VITALS
DIASTOLIC BLOOD PRESSURE: 82 MMHG | SYSTOLIC BLOOD PRESSURE: 118 MMHG | OXYGEN SATURATION: 98 % | RESPIRATION RATE: 20 BRPM | BODY MASS INDEX: 26.05 KG/M2 | HEIGHT: 63 IN | WEIGHT: 147 LBS | TEMPERATURE: 97.1 F

## 2020-10-19 DIAGNOSIS — Z11.59 NEED FOR HEPATITIS C SCREENING TEST: ICD-10-CM

## 2020-10-19 DIAGNOSIS — E78.00 HYPERCHOLESTEREMIA: ICD-10-CM

## 2020-10-19 DIAGNOSIS — Z00.00 WELLNESS EXAMINATION: Primary | ICD-10-CM

## 2020-10-19 PROCEDURE — 90694 VACC AIIV4 NO PRSRV 0.5ML IM: CPT | Performed by: INTERNAL MEDICINE

## 2020-10-19 PROCEDURE — 99397 PER PM REEVAL EST PAT 65+ YR: CPT | Performed by: INTERNAL MEDICINE

## 2020-10-19 PROCEDURE — 90472 IMMUNIZATION ADMIN EACH ADD: CPT | Performed by: INTERNAL MEDICINE

## 2020-10-19 PROCEDURE — 90471 IMMUNIZATION ADMIN: CPT | Performed by: INTERNAL MEDICINE

## 2020-10-19 PROCEDURE — 90670 PCV13 VACCINE IM: CPT | Performed by: INTERNAL MEDICINE

## 2020-10-19 RX ORDER — SIMVASTATIN 20 MG
20 TABLET ORAL DAILY
Qty: 90 TABLET | Refills: 3 | Status: SHIPPED | OUTPATIENT
Start: 2020-10-19 | End: 2020-10-28

## 2020-10-19 NOTE — PROGRESS NOTES
Subjective        Chief Complaint   Patient presents with   • Annual Exam     yearly wellness   • Hyperlipidemia           Cara Maharaj is a 65 y.o. female who presents for    Patient Active Problem List   Diagnosis   • Hypercholesteremia   • Gastroesophageal reflux disease   • Jones's esophagus without dysplasia   • Lymphocytic colitis   • Wellness examination       History of Present Illness     She denies chest pain or dyspnea.She has infrequent diarrhea with taking Entocort. She sees psychiatry every 6 months and she is stable emotionally.   No Known Allergies    Current Outpatient Medications on File Prior to Visit   Medication Sig Dispense Refill   • ARIPiprazole (ABILIFY) 5 MG tablet TK 1 T PO D  5   • Budesonide (ENTOCORT EC) 3 MG 24 hr capsule 1 tab po daily  3   • DULoxetine (CYMBALTA) 60 MG capsule TK 2 CS PO ONCE A DAY  3   • pantoprazole (PROTONIX) 40 MG EC tablet TK 1 T PO  D  3   • [DISCONTINUED] simvastatin (ZOCOR) 20 MG tablet Take 1 tablet by mouth Daily. 90 tablet 2   • [DISCONTINUED] XIIDRA 5 % solution INSTILL ONE DROP INTO BOTH EYES  TWICE DAILY  3     No current facility-administered medications on file prior to visit.        Past Medical History:   Diagnosis Date   • Alcohol abuse    • Allergic rhinitis    • Anxiety    • Cataract    • Depression    • Osteopenia    • Rosacea    • Ulcerative colitis (CMS/[facility])        Past Surgical History:   Procedure Laterality Date   • COLONOSCOPY  04/23/2019   • SHOULDER SURGERY     • TUBAL ABDOMINAL LIGATION         Family History   Problem Relation Age of Onset   • Lung cancer Mother    • Stroke Father    • Lung cancer Father    • Depression Father    • Cancer Father         melanoma   • Breast cancer Sister    • Asthma Sister    • Ulcerative colitis Sister    • No Known Problems Brother    • No Known Problems Daughter    • No Known Problems Son    • No Known Problems Maternal Grandmother    • No Known Problems Paternal Grandmother    • No Known  Problems Maternal Aunt    • No Known Problems Paternal Aunt    • BRCA 1/2 Neg Hx    • Colon cancer Neg Hx    • Endometrial cancer Neg Hx    • Ovarian cancer Neg Hx        Social History     Socioeconomic History   • Marital status:      Spouse name: Not on file   • Number of children: Not on file   • Years of education: Not on file   • Highest education level: Not on file   Tobacco Use   • Smoking status: Never Smoker   • Smokeless tobacco: Never Used   Substance and Sexual Activity   • Alcohol use: No     Frequency: Never     Comment: she has been sober for40 years   • Drug use: No   • Sexual activity: Defer           The following portions of the patient's history were reviewed and updated as appropriate: problem list, allergies, current medications, past medical history, past family history, past social history and past surgical history.    Review of Systems   Constitutional: Negative for chills, fever and unexpected weight loss.   HENT: Negative for postnasal drip and sore throat.    Eyes: Negative for blurred vision.   Respiratory: Negative for cough, shortness of breath and wheezing.    Cardiovascular: Negative for chest pain and leg swelling.   Gastrointestinal: Positive for diarrhea. Negative for abdominal pain, blood in stool, nausea, vomiting and GERD.   Endocrine: Negative for polyuria.   Genitourinary: Negative for dysuria, frequency and hematuria.   Musculoskeletal: Negative for gait problem.   Skin: Negative for rash.   Allergic/Immunologic: Negative for immunocompromised state.   Neurological: Negative for weakness.   Hematological: Does not bruise/bleed easily.   Psychiatric/Behavioral: Negative for depressed mood. The patient is not nervous/anxious.        Immunization History   Administered Date(s) Administered   • Fluad Quad 65+ 09/13/2019, 10/19/2020   • Fluzone High Dose =>65 Years ([employer] ONLY) 09/13/2019   • Hepatitis A 05/22/2018, 01/22/2019   • Pneumococcal Conjugate 13-Valent  "(PCV13) 10/19/2020   • Tdap 09/03/2014   • Zostavax 01/01/2011       Objective   Vitals:    10/19/20 1539   BP: 118/82   Resp: 20   Temp: 97.1 °F (36.2 °C)   TempSrc: Temporal   SpO2: 98%   Weight: 66.7 kg (147 lb)   Height: 160 cm (63\")     Body mass index is 26.04 kg/m².  Physical Exam  Vitals signs reviewed.   Constitutional:       Appearance: She is well-developed.   HENT:      Head: Normocephalic and atraumatic.      Mouth/Throat:      Mouth: Mucous membranes are moist.      Pharynx: Oropharynx is clear.   Eyes:      Extraocular Movements: Extraocular movements intact.      Conjunctiva/sclera: Conjunctivae normal.      Pupils: Pupils are equal, round, and reactive to light.   Neck:      Musculoskeletal: Neck supple.      Thyroid: No thyromegaly.      Vascular: No carotid bruit.   Cardiovascular:      Rate and Rhythm: Normal rate and regular rhythm.      Heart sounds: Normal heart sounds. No murmur.   Pulmonary:      Effort: Pulmonary effort is normal.      Breath sounds: Normal breath sounds.   Abdominal:      General: There is no distension.      Palpations: Abdomen is soft. There is no mass.      Tenderness: There is no abdominal tenderness. There is no rebound.   Lymphadenopathy:      Cervical: No cervical adenopathy.   Skin:     General: Skin is warm.   Neurological:      Mental Status: She is alert.   Psychiatric:         Behavior: Behavior normal.         Procedures    Assessment/Plan   Diagnoses and all orders for this visit:    1. Wellness examination (Primary)    2. Hypercholesteremia  -     Comprehensive Metabolic Panel; Future  -     Lipid Panel With / Chol / HDL Ratio; Future  -     simvastatin (ZOCOR) 20 MG tablet; Take 1 tablet by mouth Daily.  Dispense: 90 tablet; Refill: 3    3. Need for hepatitis C screening test  -     Hepatitis C Antibody; Future    Other orders  -     Fluad Quad >65 years  -     Pneumococcal Conjugate Vaccine 13-Valent All             To get MMG and DEXA in February. Flu and " Prevnar today. Recc Shingrix at the drug store. Discussed getting 150 minutes of cardio per week.    No follow-ups on file.

## 2020-10-22 ENCOUNTER — RESULTS ENCOUNTER (OUTPATIENT)
Dept: INTERNAL MEDICINE | Facility: CLINIC | Age: 65
End: 2020-10-22

## 2020-10-22 DIAGNOSIS — Z11.59 NEED FOR HEPATITIS C SCREENING TEST: ICD-10-CM

## 2020-10-22 DIAGNOSIS — E78.00 HYPERCHOLESTEREMIA: ICD-10-CM

## 2020-10-28 DIAGNOSIS — E78.00 HYPERCHOLESTEREMIA: Primary | ICD-10-CM

## 2020-10-28 LAB
ALBUMIN SERPL-MCNC: 4.5 G/DL (ref 3.5–5.2)
ALBUMIN/GLOB SERPL: 2.1 G/DL
ALP SERPL-CCNC: 66 U/L (ref 39–117)
ALT SERPL-CCNC: 18 U/L (ref 1–33)
AST SERPL-CCNC: 20 U/L (ref 1–32)
BILIRUB SERPL-MCNC: 0.3 MG/DL (ref 0–1.2)
BUN SERPL-MCNC: 15 MG/DL (ref 8–23)
BUN/CREAT SERPL: 16.5 (ref 7–25)
CALCIUM SERPL-MCNC: 9.5 MG/DL (ref 8.6–10.5)
CHLORIDE SERPL-SCNC: 104 MMOL/L (ref 98–107)
CHOLEST SERPL-MCNC: 266 MG/DL (ref 0–200)
CHOLEST/HDLC SERPL: 2.74 {RATIO}
CO2 SERPL-SCNC: 30.3 MMOL/L (ref 22–29)
CREAT SERPL-MCNC: 0.91 MG/DL (ref 0.57–1)
GLOBULIN SER CALC-MCNC: 2.1 GM/DL
GLUCOSE SERPL-MCNC: 101 MG/DL (ref 65–99)
HCV AB S/CO SERPL IA: <0.1 S/CO RATIO (ref 0–0.9)
HDLC SERPL-MCNC: 97 MG/DL (ref 40–60)
LDLC SERPL CALC-MCNC: 146 MG/DL (ref 0–100)
POTASSIUM SERPL-SCNC: 4.6 MMOL/L (ref 3.5–5.2)
PROT SERPL-MCNC: 6.6 G/DL (ref 6–8.5)
SODIUM SERPL-SCNC: 142 MMOL/L (ref 136–145)
TRIGL SERPL-MCNC: 137 MG/DL (ref 0–150)
VLDLC SERPL CALC-MCNC: 23 MG/DL (ref 5–40)

## 2020-10-28 RX ORDER — ATORVASTATIN CALCIUM 40 MG/1
40 TABLET, FILM COATED ORAL DAILY
Qty: 30 TABLET | Refills: 3 | Status: SHIPPED | OUTPATIENT
Start: 2020-10-28 | End: 2021-02-26

## 2021-01-21 DIAGNOSIS — E78.00 HYPERCHOLESTEREMIA: ICD-10-CM

## 2021-01-22 LAB
ALBUMIN SERPL-MCNC: 4.4 G/DL (ref 3.5–5.2)
ALBUMIN/GLOB SERPL: 1.8 G/DL
ALP SERPL-CCNC: 56 U/L (ref 39–117)
ALT SERPL-CCNC: 17 U/L (ref 1–33)
AST SERPL-CCNC: 20 U/L (ref 1–32)
BILIRUB SERPL-MCNC: 0.3 MG/DL (ref 0–1.2)
BUN SERPL-MCNC: 13 MG/DL (ref 8–23)
BUN/CREAT SERPL: 14 (ref 7–25)
CALCIUM SERPL-MCNC: 9.5 MG/DL (ref 8.6–10.5)
CHLORIDE SERPL-SCNC: 104 MMOL/L (ref 98–107)
CHOLEST SERPL-MCNC: 194 MG/DL (ref 0–200)
CHOLEST/HDLC SERPL: 2.16 {RATIO}
CO2 SERPL-SCNC: 29.1 MMOL/L (ref 22–29)
CREAT SERPL-MCNC: 0.93 MG/DL (ref 0.57–1)
GLOBULIN SER CALC-MCNC: 2.4 GM/DL
GLUCOSE SERPL-MCNC: 95 MG/DL (ref 65–99)
HDLC SERPL-MCNC: 90 MG/DL (ref 40–60)
LDLC SERPL CALC-MCNC: 88 MG/DL (ref 0–100)
POTASSIUM SERPL-SCNC: 4.6 MMOL/L (ref 3.5–5.2)
PROT SERPL-MCNC: 6.8 G/DL (ref 6–8.5)
SODIUM SERPL-SCNC: 142 MMOL/L (ref 136–145)
TRIGL SERPL-MCNC: 91 MG/DL (ref 0–150)
VLDLC SERPL CALC-MCNC: 16 MG/DL (ref 5–40)

## 2021-01-26 ENCOUNTER — OFFICE VISIT (OUTPATIENT)
Dept: INTERNAL MEDICINE | Facility: CLINIC | Age: 66
End: 2021-01-26

## 2021-01-26 VITALS
DIASTOLIC BLOOD PRESSURE: 76 MMHG | SYSTOLIC BLOOD PRESSURE: 122 MMHG | TEMPERATURE: 97.1 F | WEIGHT: 145 LBS | BODY MASS INDEX: 25.69 KG/M2 | HEIGHT: 63 IN

## 2021-01-26 DIAGNOSIS — E78.00 HYPERCHOLESTEREMIA: Primary | Chronic | ICD-10-CM

## 2021-01-26 PROCEDURE — 99213 OFFICE O/P EST LOW 20 MIN: CPT | Performed by: INTERNAL MEDICINE

## 2021-01-26 NOTE — PROGRESS NOTES
Subjective        Chief Complaint   Patient presents with   • Hyperlipidemia           Cara Maharaj is a 65 y.o. female who presents for    Patient Active Problem List   Diagnosis   • Hypercholesteremia   • Gastroesophageal reflux disease   • Jones's esophagus without dysplasia   • Lymphocytic colitis       History of Present Illness     She denies chest pain or dyspnea. She has had no side effects with Lipitor.  No Known Allergies    Current Outpatient Medications on File Prior to Visit   Medication Sig Dispense Refill   • ARIPiprazole (ABILIFY) 5 MG tablet TK 1 T PO D  5   • atorvastatin (Lipitor) 40 MG tablet Take 1 tablet by mouth Daily. 30 tablet 3   • Budesonide (ENTOCORT EC) 3 MG 24 hr capsule 1 tab po daily  3   • DULoxetine (CYMBALTA) 60 MG capsule TK 2 CS PO ONCE A DAY  3   • [DISCONTINUED] pantoprazole (PROTONIX) 40 MG EC tablet TK 1 T PO  D  3     No current facility-administered medications on file prior to visit.        Past Medical History:   Diagnosis Date   • Alcohol abuse    • Allergic rhinitis    • Anxiety    • Cataract    • Depression    • Osteopenia    • Rosacea    • Ulcerative colitis (CMS/HCC)        Past Surgical History:   Procedure Laterality Date   • COLONOSCOPY  04/23/2019   • SHOULDER SURGERY     • TUBAL ABDOMINAL LIGATION         Family History   Problem Relation Age of Onset   • Lung cancer Mother    • Stroke Father    • Lung cancer Father    • Depression Father    • Cancer Father         melanoma   • Breast cancer Sister    • Asthma Sister    • Ulcerative colitis Sister    • No Known Problems Brother    • No Known Problems Daughter    • No Known Problems Son    • No Known Problems Maternal Grandmother    • No Known Problems Paternal Grandmother    • No Known Problems Maternal Aunt    • No Known Problems Paternal Aunt    • BRCA 1/2 Neg Hx    • Colon cancer Neg Hx    • Endometrial cancer Neg Hx    • Ovarian cancer Neg Hx        Social History     Socioeconomic History   • Marital  "status:      Spouse name: Not on file   • Number of children: Not on file   • Years of education: Not on file   • Highest education level: Not on file   Tobacco Use   • Smoking status: Never Smoker   • Smokeless tobacco: Never Used   Substance and Sexual Activity   • Alcohol use: No     Frequency: Never     Comment: she has been sober for40 years   • Drug use: No   • Sexual activity: Defer           The following portions of the patient's history were reviewed and updated as appropriate: problem list, allergies, current medications, past medical history, past family history, past social history and past surgical history.    Review of Systems    Immunization History   Administered Date(s) Administered   • Fluad Quad 65+ 09/13/2019, 10/19/2020   • Fluzone High Dose =>65 Years ([employer] ONLY) 09/13/2019   • Hepatitis A 05/22/2018, 01/22/2019   • Pneumococcal Conjugate 13-Valent (PCV13) 10/19/2020   • Tdap 09/03/2014   • Zostavax 01/01/2011       Objective   Vitals:    01/26/21 0918   BP: 122/76   Temp: 97.1 °F (36.2 °C)   Weight: 65.8 kg (145 lb)   Height: 160 cm (63\")     Body mass index is 25.69 kg/m².  Physical Exam  Vitals signs reviewed.   Constitutional:       Appearance: She is well-developed.   HENT:      Head: Normocephalic and atraumatic.   Cardiovascular:      Rate and Rhythm: Normal rate and regular rhythm.      Heart sounds: Normal heart sounds, S1 normal and S2 normal.   Pulmonary:      Effort: Pulmonary effort is normal.      Breath sounds: Normal breath sounds.   Skin:     General: Skin is warm.   Neurological:      Mental Status: She is alert.   Psychiatric:         Behavior: Behavior normal.         Procedures    Assessment/Plan   Diagnoses and all orders for this visit:    1. Hypercholesteremia (Primary)               Reviewed CMP and FLP. LDL is much better with Lipitor. Reviewed side effects. Answered questions about COVID and travel  No follow-ups on file.  "

## 2021-02-02 ENCOUNTER — OFFICE VISIT (OUTPATIENT)
Dept: OBSTETRICS AND GYNECOLOGY | Age: 66
End: 2021-02-02

## 2021-02-02 VITALS
DIASTOLIC BLOOD PRESSURE: 70 MMHG | BODY MASS INDEX: 26.54 KG/M2 | WEIGHT: 149.8 LBS | HEIGHT: 63 IN | SYSTOLIC BLOOD PRESSURE: 110 MMHG

## 2021-02-02 DIAGNOSIS — Z01.419 ENCOUNTER FOR GYNECOLOGICAL EXAMINATION: Primary | ICD-10-CM

## 2021-02-02 DIAGNOSIS — A60.00 GENITAL HERPES SIMPLEX, UNSPECIFIED SITE: ICD-10-CM

## 2021-02-02 DIAGNOSIS — Z12.31 ENCOUNTER FOR SCREENING MAMMOGRAM FOR MALIGNANT NEOPLASM OF BREAST: ICD-10-CM

## 2021-02-02 DIAGNOSIS — Z11.51 ENCOUNTER FOR SCREENING FOR HUMAN PAPILLOMAVIRUS (HPV): ICD-10-CM

## 2021-02-02 DIAGNOSIS — Z13.820 SCREENING FOR OSTEOPOROSIS: ICD-10-CM

## 2021-02-02 PROCEDURE — 99387 INIT PM E/M NEW PAT 65+ YRS: CPT | Performed by: OBSTETRICS & GYNECOLOGY

## 2021-02-02 RX ORDER — VALACYCLOVIR HYDROCHLORIDE 500 MG/1
500 TABLET, FILM COATED ORAL 2 TIMES DAILY
Qty: 6 TABLET | Refills: 3 | Status: SHIPPED | OUTPATIENT
Start: 2021-02-02 | End: 2021-02-05

## 2021-02-02 NOTE — PROGRESS NOTES
.  Subjective     Chief Complaint   Patient presents with   • Gynecologic Exam        NP    LAE 17 (SARAH), MG        History of Present Illness    Cara Maharaj is a 65 y.o.  who presents for annual exam. She was a pt of Dr. Bynum and is new to this MD. She comes in to re-establish care.  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  She denies any gyn issues. She notes an occasional genital HSV outbreak and would like prn treatment    Obstetric History:  OB History        2    Para   2    Term   2            AB        Living           SAB        TAB        Ectopic        Molar        Multiple        Live Births                   Menstrual History:     No LMP recorded (lmp unknown). Patient is postmenopausal.         Current contraception: post menopausal status  History of abnormal Pap smear: yes - had colposcopy and f/u negative  Received Gardasil immunization: no  Perform regular self breast exam: yes - reg  Family history of uterine or ovarian cancer: no  Family History of colon cancer: no  Family history of breast cancer: yes - sister at age 65    Mammogram: ordered.  Colonoscopy: up to date, done  and due again in 5 years per operative note  DEXA: ordered.    Exercise: moderately active  Calcium/Vitamin D: adequate intake    The following portions of the patient's history were reviewed and updated as appropriate: allergies, current medications, past family history, past medical history, past social history, past surgical history and problem list.    Review of Systems    Review of Systems   Constitutional: Negative for fatigue.   Respiratory: Negative for shortness of breath.    Gastrointestinal: Negative for abdominal pain. Negative for change in bowel habits  Genitourinary: Negative for dysuria. Negative for incontinence, vaginal bleeding or pelvic pain  Neurological: Negative for headaches.   Psychiatric/Behavioral: Negative for dysphoric mood.  "        Objective   Physical Exam    /70   Ht 160 cm (63\")   Wt 67.9 kg (149 lb 12.8 oz)   LMP  (LMP Unknown)   Breastfeeding No   BMI 26.54 kg/m²   General:   alert and no distress   Heart: regular rate and rhythm   Lungs: clear to auscultation bilaterally   Breast: Inspection negative; no masses, retractions, nipple discharge or axillary adenopathy noted   Neck: Supple, no thyromegaly   Abdomen: soft, non-tender. Bowel sounds normal. No masses,  no organomegaly   Pelvis: External genitalia: normal general appearance  Urinary system: urethral meatus normal  Vaginal: normal mucosa without prolapse or lesions  Cervix: normal appearance  Adnexa: no masses or tenderness  Uterus: normal, nontender   Extremities: Extremities normal, atraumatic, no cyanosis or edema   Neurologic: Alert and oriented   Psychiatric: Normal affect, judgement, and mood     Assessment/Plan   Diagnoses and all orders for this visit:    1. Encounter for gynecological examination (Primary)  -     IGP, Apt HPV,rfx 16 / 18,45    2. Encounter for screening for human papillomavirus (HPV)  -     IGP, Apt HPV,rfx 16 / 18,45    3. Encounter for screening mammogram for malignant neoplasm of breast  -     Mammo Screening Digital Tomosynthesis Bilateral With CAD; Future    4. Screening for osteoporosis  -     DEXA Bone Density Axial; Future    5. Genital herpes simplex, unspecified site    Other orders  -     valACYclovir (Valtrex) 500 MG tablet; Take 1 tablet by mouth 2 (Two) Times a Day for 3 days. As needed  Dispense: 6 tablet; Refill: 3        All questions answered.  Breast self exam technique reviewed and patient encouraged to perform self-exam monthly.  Discussed healthy lifestyle modifications.  Recommended 30 minutes of aerobic exercise five times per week.  Discussed calcium/ Vit D needs to prevent osteoporosis.  Advised pt to call if she does not receive results of pap within 2 weeks; encouraged her to book mammogram and dexa. She was " booked today but tech had to cancel all studies today. Advised pt to call if she does not receive results of imaging within 2 weeks

## 2021-02-04 LAB
CYTOLOGIST CVX/VAG CYTO: NORMAL
CYTOLOGY CVX/VAG DOC CYTO: NORMAL
CYTOLOGY CVX/VAG DOC THIN PREP: NORMAL
DX ICD CODE: NORMAL
HIV 1 & 2 AB SER-IMP: NORMAL
HPV I/H RISK 4 DNA CVX QL PROBE+SIG AMP: NEGATIVE
OTHER STN SPEC: NORMAL
STAT OF ADQ CVX/VAG CYTO-IMP: NORMAL

## 2021-02-05 ENCOUNTER — TELEPHONE (OUTPATIENT)
Dept: OBSTETRICS AND GYNECOLOGY | Age: 66
End: 2021-02-05

## 2021-02-05 NOTE — TELEPHONE ENCOUNTER
----- Message from Carol Solano MD sent at 2/5/2021  8:40 AM EST -----  Please call pt, her pap and hpv are negative/normal

## 2021-02-25 DIAGNOSIS — E78.00 HYPERCHOLESTEREMIA: ICD-10-CM

## 2021-02-26 RX ORDER — ATORVASTATIN CALCIUM 40 MG/1
40 TABLET, FILM COATED ORAL DAILY
Qty: 11 TABLET | Refills: 3 | Status: SHIPPED | OUTPATIENT
Start: 2021-02-26 | End: 2021-03-04

## 2021-03-03 DIAGNOSIS — E78.00 HYPERCHOLESTEREMIA: ICD-10-CM

## 2021-03-03 NOTE — TELEPHONE ENCOUNTER
Patients Pharmacy called today, states Atorvastatin 40 MG tablet was prescribed for 11 days, please send a new script, (patient did not pick that Rx up).

## 2021-03-04 RX ORDER — ATORVASTATIN CALCIUM 40 MG/1
40 TABLET, FILM COATED ORAL DAILY
Qty: 30 TABLET | Refills: 8 | Status: SHIPPED | OUTPATIENT
Start: 2021-03-04 | End: 2021-12-13

## 2021-03-22 ENCOUNTER — BULK ORDERING (OUTPATIENT)
Dept: CASE MANAGEMENT | Facility: OTHER | Age: 66
End: 2021-03-22

## 2021-03-22 DIAGNOSIS — Z23 IMMUNIZATION DUE: ICD-10-CM

## 2021-04-15 ENCOUNTER — PROCEDURE VISIT (OUTPATIENT)
Dept: OBSTETRICS AND GYNECOLOGY | Age: 66
End: 2021-04-15

## 2021-04-15 ENCOUNTER — APPOINTMENT (OUTPATIENT)
Dept: WOMENS IMAGING | Facility: HOSPITAL | Age: 66
End: 2021-04-15

## 2021-04-15 DIAGNOSIS — Z78.0 POST-MENOPAUSAL: Primary | ICD-10-CM

## 2021-04-15 DIAGNOSIS — Z12.31 VISIT FOR SCREENING MAMMOGRAM: Primary | ICD-10-CM

## 2021-04-15 PROCEDURE — 77067 SCR MAMMO BI INCL CAD: CPT | Performed by: OBSTETRICS & GYNECOLOGY

## 2021-04-15 PROCEDURE — 77080 DXA BONE DENSITY AXIAL: CPT | Performed by: OBSTETRICS & GYNECOLOGY

## 2021-04-15 PROCEDURE — 77067 SCR MAMMO BI INCL CAD: CPT | Performed by: RADIOLOGY

## 2021-04-29 ENCOUNTER — OFFICE (AMBULATORY)
Dept: URBAN - METROPOLITAN AREA CLINIC 75 | Facility: CLINIC | Age: 66
End: 2021-04-29

## 2021-04-29 VITALS
HEIGHT: 63 IN | WEIGHT: 153 LBS | TEMPERATURE: 97.3 F | DIASTOLIC BLOOD PRESSURE: 82 MMHG | SYSTOLIC BLOOD PRESSURE: 120 MMHG

## 2021-04-29 DIAGNOSIS — Z83.71 FAMILY HISTORY OF COLONIC POLYPS: ICD-10-CM

## 2021-04-29 DIAGNOSIS — R19.7 DIARRHEA, UNSPECIFIED: ICD-10-CM

## 2021-04-29 DIAGNOSIS — K22.70 BARRETT'S ESOPHAGUS WITHOUT DYSPLASIA: ICD-10-CM

## 2021-04-29 PROCEDURE — 99213 OFFICE O/P EST LOW 20 MIN: CPT | Performed by: INTERNAL MEDICINE

## 2021-05-06 ENCOUNTER — OFFICE VISIT (OUTPATIENT)
Dept: OBSTETRICS AND GYNECOLOGY | Age: 66
End: 2021-05-06

## 2021-05-06 VITALS
HEIGHT: 63 IN | WEIGHT: 155.4 LBS | DIASTOLIC BLOOD PRESSURE: 68 MMHG | BODY MASS INDEX: 27.54 KG/M2 | SYSTOLIC BLOOD PRESSURE: 102 MMHG

## 2021-05-06 DIAGNOSIS — M85.80 OSTEOPENIA, UNSPECIFIED LOCATION: Primary | ICD-10-CM

## 2021-05-06 PROCEDURE — 99212 OFFICE O/P EST SF 10 MIN: CPT | Performed by: OBSTETRICS & GYNECOLOGY

## 2021-05-06 NOTE — PROGRESS NOTES
"Chief complaint:osteopenia    HPI  Cara Maharaj is a 65 y.o. female who presents for review of bone density and discussion.         The following portions of the patient's history were reviewed and updated as appropriate: allergies, current medications, past family history, past medical history, past social history, past surgical history and problem list.    Review of Systems  Pertinent items are noted in HPI.    /68   Ht 160 cm (63\")   Wt 70.5 kg (155 lb 6.4 oz)   LMP  (LMP Unknown)   Breastfeeding No   BMI 27.53 kg/m²         Physical Exam  Constitutional:       Appearance: Normal appearance.   Pulmonary:      Effort: Pulmonary effort is normal.   Neurological:      General: No focal deficit present.      Mental Status: She is alert and oriented to person, place, and time.   Psychiatric:         Mood and Affect: Mood normal.         Behavior: Behavior normal.             Diagnoses and all orders for this visit:    1. Osteopenia, unspecified location (Primary)  -     Ambulatory Referral to [facility]      Frax calculation performed and treatment for bone density indicated. Reviewed with pt who agrees. She has significant GERD history. Recommended referral to osteoporosis treatment center and she agrees. Advised her to call me if she does not receive contact to book within 1 week.          "

## 2021-12-11 DIAGNOSIS — E78.00 HYPERCHOLESTEREMIA: ICD-10-CM

## 2021-12-13 RX ORDER — ATORVASTATIN CALCIUM 40 MG/1
40 TABLET, FILM COATED ORAL DAILY
Qty: 30 TABLET | Refills: 8 | Status: SHIPPED | OUTPATIENT
Start: 2021-12-13 | End: 2022-01-20 | Stop reason: SDUPTHER

## 2022-01-13 DIAGNOSIS — E78.00 HYPERCHOLESTEREMIA: Primary | ICD-10-CM

## 2022-01-14 LAB
ALBUMIN SERPL-MCNC: 4.4 G/DL (ref 3.8–4.8)
ALBUMIN/GLOB SERPL: 1.8 {RATIO} (ref 1.2–2.2)
ALP SERPL-CCNC: 55 IU/L (ref 44–121)
ALT SERPL-CCNC: 14 IU/L (ref 0–32)
AST SERPL-CCNC: 18 IU/L (ref 0–40)
BILIRUB SERPL-MCNC: 0.2 MG/DL (ref 0–1.2)
BUN SERPL-MCNC: 15 MG/DL (ref 8–27)
BUN/CREAT SERPL: 17 (ref 12–28)
CALCIUM SERPL-MCNC: 9.4 MG/DL (ref 8.7–10.3)
CHLORIDE SERPL-SCNC: 105 MMOL/L (ref 96–106)
CHOLEST SERPL-MCNC: 203 MG/DL (ref 100–199)
CHOLEST/HDLC SERPL: 2.4 RATIO (ref 0–4.4)
CO2 SERPL-SCNC: 25 MMOL/L (ref 20–29)
CREAT SERPL-MCNC: 0.87 MG/DL (ref 0.57–1)
GLOBULIN SER CALC-MCNC: 2.4 G/DL (ref 1.5–4.5)
GLUCOSE SERPL-MCNC: 97 MG/DL (ref 65–99)
HDLC SERPL-MCNC: 86 MG/DL
LDLC SERPL CALC-MCNC: 101 MG/DL (ref 0–99)
POTASSIUM SERPL-SCNC: 4.7 MMOL/L (ref 3.5–5.2)
PROT SERPL-MCNC: 6.8 G/DL (ref 6–8.5)
SODIUM SERPL-SCNC: 143 MMOL/L (ref 134–144)
TRIGL SERPL-MCNC: 89 MG/DL (ref 0–149)
VLDLC SERPL CALC-MCNC: 16 MG/DL (ref 5–40)

## 2022-01-20 ENCOUNTER — OFFICE VISIT (OUTPATIENT)
Dept: INTERNAL MEDICINE | Facility: CLINIC | Age: 67
End: 2022-01-20

## 2022-01-20 VITALS
HEIGHT: 63 IN | SYSTOLIC BLOOD PRESSURE: 108 MMHG | BODY MASS INDEX: 26.4 KG/M2 | TEMPERATURE: 97.8 F | DIASTOLIC BLOOD PRESSURE: 76 MMHG | WEIGHT: 149 LBS

## 2022-01-20 DIAGNOSIS — Z00.00 WELLNESS EXAMINATION: Primary | ICD-10-CM

## 2022-01-20 DIAGNOSIS — E78.00 HYPERCHOLESTEREMIA: Chronic | ICD-10-CM

## 2022-01-20 DIAGNOSIS — H93.11 TINNITUS OF RIGHT EAR: ICD-10-CM

## 2022-01-20 DIAGNOSIS — Q75.9 SKULL ASYMMETRY: ICD-10-CM

## 2022-01-20 PROBLEM — A60.00 GENITAL HERPES: Status: RESOLVED | Noted: 2021-02-02 | Resolved: 2022-01-20

## 2022-01-20 PROCEDURE — 90471 IMMUNIZATION ADMIN: CPT | Performed by: INTERNAL MEDICINE

## 2022-01-20 PROCEDURE — 99397 PER PM REEVAL EST PAT 65+ YR: CPT | Performed by: INTERNAL MEDICINE

## 2022-01-20 PROCEDURE — 90732 PPSV23 VACC 2 YRS+ SUBQ/IM: CPT | Performed by: INTERNAL MEDICINE

## 2022-01-20 RX ORDER — OMEPRAZOLE 20 MG/1
40 CAPSULE, DELAYED RELEASE ORAL DAILY
COMMUNITY
End: 2023-03-17

## 2022-01-20 RX ORDER — ATORVASTATIN CALCIUM 40 MG/1
40 TABLET, FILM COATED ORAL DAILY
Qty: 90 TABLET | Refills: 3 | Status: SHIPPED | OUTPATIENT
Start: 2022-01-20 | End: 2022-09-07

## 2022-01-20 RX ORDER — ZOLEDRONIC ACID 5 MG/100ML
5 INJECTION, SOLUTION INTRAVENOUS ONCE
COMMUNITY

## 2022-01-20 NOTE — PROGRESS NOTES
Subjective        Chief Complaint   Patient presents with   • Annual Exam           Cara Maharaj is a 66 y.o. female who presents for    Patient Active Problem List   Diagnosis   • Hypercholesteremia   • Gastroesophageal reflux disease   • Jones's esophagus without dysplasia   • Lymphocytic colitis       History of Present Illness     She had a MMG and DEXA with her gyn. She was started on Reclast. She sees derm once per year. She is exercising by walking 2 miles 7 days per week. She denies chest pain or dyspnea. She sees the psychiatrist every 6 months. She is stable emotionally. She has had static in her right ear for months. She has no hearing loss. She has rare dizziness. She can have some ringing in her right ear. She went to concerts in the past. She has noticed a sunken area on the right of a ridge on her skull. She has no pain.  No Known Allergies    Current Outpatient Medications on File Prior to Visit   Medication Sig Dispense Refill   • ARIPiprazole (ABILIFY) 5 MG tablet TK 1 T PO D  5   • Budesonide (ENTOCORT EC) 3 MG 24 hr capsule 1 tab po daily  3   • DULoxetine (CYMBALTA) 60 MG capsule TK 2 CS PO ONCE A DAY  3   • metroNIDAZOLE (METROCREAM) 0.75 % cream Apply  topically to the appropriate area as directed 2 (Two) Times a Day.     • omeprazole (priLOSEC) 20 MG capsule Take 40 mg by mouth Daily.     • zoledronic acid (RECLAST) 5 MG/100ML solution infusion Infuse 5 mg into a venous catheter 1 (One) Time.     • [DISCONTINUED] atorvastatin (LIPITOR) 40 MG tablet TAKE 1 TABLET BY MOUTH DAILY 30 tablet 8     No current facility-administered medications on file prior to visit.       Past Medical History:   Diagnosis Date   • Alcohol abuse    • Allergic rhinitis    • Anxiety    • COVID-19 10/2021   • Depression    • Genital herpes 2/2/2021   • Osteopenia    • Rosacea    • Ulcerative colitis (HCC)        Past Surgical History:   Procedure Laterality Date   • CATARACT EXTRACTION, BILATERAL     • COLONOSCOPY   · Refill requested by: Pharmacy Interface  · Last office visit for depression/psychotropics: 4/12/21  · Next office visit: 10/19/21  · Medication(s) Requested:   DULoxetine (Cymbalta) 60 MG capsule 90 capsule 0 5/28/2021     Sig - Route: Take 1 capsule by mouth daily. - Oral    Sent to pharmacy as: DULoxetine HCl 60 MG Oral Capsule Delayed Release Particles (Cymbalta)    Class: Eprescribe      · Quantity requested: 90  Clinician to determine if on stable therapy (no changes in 1 year)  No controlled anxiolytics:  Buspar, Atarax ok  Refill if seen within the last 6 months  Can not refill without MD authorization     04/23/2019   • SHOULDER SURGERY     • TUBAL ABDOMINAL LIGATION         Family History   Problem Relation Age of Onset   • Lung cancer Mother    • Stroke Father    • Lung cancer Father    • Depression Father    • Melanoma Father    • Breast cancer Sister    • Asthma Sister    • Ulcerative colitis Sister    • No Known Problems Brother    • No Known Problems Daughter    • No Known Problems Son    • No Known Problems Maternal Grandmother    • No Known Problems Paternal Grandmother    • No Known Problems Maternal Aunt    • No Known Problems Paternal Aunt    • BRCA 1/2 Neg Hx    • Colon cancer Neg Hx    • Endometrial cancer Neg Hx    • Ovarian cancer Neg Hx    • Uterine cancer Neg Hx        Social History     Socioeconomic History   • Marital status:    Tobacco Use   • Smoking status: Never Smoker   • Smokeless tobacco: Never Used   Substance and Sexual Activity   • Alcohol use: No     Comment: she has been sober for 40 years   • Drug use: No   • Sexual activity: Yes     Partners: Male     Birth control/protection: None           The following portions of the patient's history were reviewed and updated as appropriate: problem list, allergies, current medications, past medical history, past family history, past social history and past surgical history.    Review of Systems   Constitutional: Negative for chills, fever and unexpected weight loss.   HENT: Positive for ear pain. Negative for postnasal drip and sore throat.    Eyes: Negative for blurred vision.   Respiratory: Negative for cough, shortness of breath and wheezing.    Cardiovascular: Negative for chest pain and leg swelling.   Gastrointestinal: Negative for abdominal pain, blood in stool, nausea, vomiting and GERD.   Endocrine: Negative for polyuria.   Genitourinary: Negative for dysuria, frequency and hematuria.   Musculoskeletal: Negative for gait problem.   Skin: Negative for rash.   Allergic/Immunologic: Negative for immunocompromised state.  "  Neurological: Negative for weakness.   Hematological: Does not bruise/bleed easily.   Psychiatric/Behavioral: Negative for depressed mood. The patient is not nervous/anxious.        Immunization History   Administered Date(s) Administered   • COVID-19 (PFIZER) PURPLE CAP 03/01/2021, 03/22/2021, 11/27/2021   • Fluad Quad 65+ 09/13/2019, 10/19/2020, 11/27/2021   • Fluzone High Dose =>65 Years ([employer] ONLY) 09/13/2019   • Hepatitis A 05/22/2018, 01/22/2019   • Pneumococcal Conjugate 13-Valent (PCV13) 10/19/2020   • Shingrix 10/22/2020, 12/22/2020   • Tdap 09/03/2014   • Zostavax 01/01/2011       Objective   Vitals:    01/20/22 1618   BP: 108/76   Temp: 97.8 °F (36.6 °C)   Weight: 67.6 kg (149 lb)   Height: 160 cm (62.99\")     Body mass index is 26.4 kg/m².  Physical Exam  Vitals reviewed.   Constitutional:       Appearance: She is well-developed.   HENT:      Head: Normocephalic and atraumatic.      Comments: Palpable ridge midline anterior skull with small indentation to the right.     Right Ear: Tympanic membrane and ear canal normal.      Left Ear: Tympanic membrane and ear canal normal.      Ears:      Comments: Minimal cerumen in right ear     Mouth/Throat:      Mouth: Mucous membranes are moist.      Pharynx: Oropharynx is clear.   Eyes:      Extraocular Movements: Extraocular movements intact.      Conjunctiva/sclera: Conjunctivae normal.      Pupils: Pupils are equal, round, and reactive to light.   Neck:      Thyroid: No thyromegaly.      Vascular: No carotid bruit.   Cardiovascular:      Rate and Rhythm: Normal rate and regular rhythm.      Heart sounds: Normal heart sounds. No murmur heard.      Pulmonary:      Effort: Pulmonary effort is normal.      Breath sounds: Normal breath sounds.   Abdominal:      General: There is no distension.      Palpations: Abdomen is soft. There is no mass.      Tenderness: There is no abdominal tenderness. There is no rebound.   Musculoskeletal:      Cervical back: Neck " supple.   Lymphadenopathy:      Cervical: No cervical adenopathy.   Skin:     General: Skin is warm.   Neurological:      Mental Status: She is alert.   Psychiatric:         Behavior: Behavior normal.         Procedures    Assessment/Plan   Diagnoses and all orders for this visit:    1. Wellness examination (Primary)    2. Hypercholesteremia  -     atorvastatin (LIPITOR) 40 MG tablet; Take 1 tablet by mouth Daily.  Dispense: 90 tablet; Refill: 3  -     Comprehensive Metabolic Panel; Future  -     Lipid Panel With / Chol / HDL Ratio; Future    3. Tinnitus of right ear  -     Ambulatory Referral to ENT (Otolaryngology)    4. Skull asymmetry  Comments:  Offered xray. She will call if decides to do.    Other orders  -     Pneumococcal Polysaccharide Vaccine 23-Valent Greater Than or Equal To 3yo Subcutaneous / IM             Pneumovax today. Reviewed CMP and FLP. Cscope, MMG and DEXA are UTD. She exercises regularly.    Return in about 1 year (around 1/20/2023) for Annual physical, Lab Before FUP.

## 2022-02-08 ENCOUNTER — OFFICE VISIT (OUTPATIENT)
Dept: OBSTETRICS AND GYNECOLOGY | Age: 67
End: 2022-02-08

## 2022-02-08 VITALS
HEIGHT: 63 IN | SYSTOLIC BLOOD PRESSURE: 120 MMHG | WEIGHT: 138 LBS | DIASTOLIC BLOOD PRESSURE: 72 MMHG | BODY MASS INDEX: 24.45 KG/M2

## 2022-02-08 DIAGNOSIS — A60.04 HERPES, VULVOVAGINITIS: ICD-10-CM

## 2022-02-08 DIAGNOSIS — Z01.419 ENCOUNTER FOR GYNECOLOGICAL EXAMINATION: Primary | ICD-10-CM

## 2022-02-08 PROCEDURE — 99397 PER PM REEVAL EST PAT 65+ YR: CPT | Performed by: OBSTETRICS & GYNECOLOGY

## 2022-02-08 RX ORDER — VALACYCLOVIR HYDROCHLORIDE 500 MG/1
500 TABLET, FILM COATED ORAL 2 TIMES DAILY
Qty: 6 TABLET | Refills: 4 | Status: SHIPPED | OUTPATIENT
Start: 2022-02-08 | End: 2022-02-11

## 2022-02-08 NOTE — PROGRESS NOTES
".  Subjective     Chief Complaint   Patient presents with   • Gynecologic Exam     Last Pap 2021 NEG, HPV NEG, MG 04/15/2021, CSCOPE 2019, DEXA 04/15/2021, Pt has no complaints today        History of Present Illness    Cara Maharaj is a 66 y.o.  who presents for annual exam.  She denies any vag bleeding or gyn issues.   She saw osteoporosis specialist and they started Reclast.    She has occ HSV outbreaks and requests prn rx      Obstetric History:  OB History        2    Para   2    Term   2            AB        Living           SAB        IAB        Ectopic        Molar        Multiple        Live Births                   Menstrual History:     No LMP recorded (lmp unknown). Patient is postmenopausal.         Current contraception: post menopausal status  History of abnormal Pap smear: yes - had colpo then neg f/u  Received Gardasil immunization: no  Perform regular self breast exam: yes - reg  Family history of uterine or ovarian cancer: no  Family History of colon cancer: no  Family history of breast cancer: yes - sister after menopause    Mammogram: up to date, benign 4/15/21  Colonoscopy: up to date, done  and f/u 5 yrs per surgeon  DEXA: up to date.    Exercise: moderately active  Calcium/Vitamin D: adequate intake    The following portions of the patient's history were reviewed and updated as appropriate: allergies, current medications, past family history, past medical history, past social history, past surgical history and problem list.    Review of Systems    Review of Systems   Constitutional: Negative for fatigue.   Respiratory: Negative for shortness of breath.    Gastrointestinal: Negative for abdominal pain.   Genitourinary: Negative for vaginal bleeding or urinary incontinence  Neurological: Negative for headaches.   Psychiatric/Behavioral: Negative for dysphoric mood.         Objective   Physical Exam    /72   Ht 160 cm (62.99\")   Wt 62.6 kg (138 lb)  "  LMP  (LMP Unknown)   Breastfeeding No   BMI 24.45 kg/m²   General:   Alert, in no distress   Heart: regular rate and rhythm   Lungs: clear to auscultation bilaterally   Breast: Inspection negative; no masses, retractions, nipple discharge or axillary adenopathy in either breast   Neck: Supple, no thyromegaly   Abdomen: Soft, no tenderness or guarding   Pelvis: External genitalia: normal general appearance  Urinary system: urethral meatus normal  Vaginal: atrophic mucosa without prolapse or lesions  Cervix: normal appearance  Adnexa: no masses or tenderness  Uterus: normal, nontender   Extremities: Normal without edema   Neurologic: Alert and oriented   Psychiatric: Normal affect, judgment and mood     Assessment/Plan   Diagnoses and all orders for this visit:    1. Encounter for gynecological examination (Primary)    2. Herpes, vulvovaginitis    Other orders  -     valACYclovir (Valtrex) 500 MG tablet; Take 1 tablet by mouth 2 (Two) Times a Day for 3 days. As needed  Dispense: 6 tablet; Refill: 4        All questions answered.  Breast self exam technique reviewed and patient encouraged to perform self-exam monthly.  Discussed healthy lifestyle modifications.  Recommended 30 minutes of aerobic exercise five times per week.  Discussed calcium/ Vit D needs to prevent osteoporosis.  Pap deferred as up to date and pt agrees  Pt will book mammogram due later this spring

## 2022-05-10 ENCOUNTER — PROCEDURE VISIT (OUTPATIENT)
Dept: OBSTETRICS AND GYNECOLOGY | Age: 67
End: 2022-05-10

## 2022-05-10 ENCOUNTER — APPOINTMENT (OUTPATIENT)
Dept: WOMENS IMAGING | Facility: HOSPITAL | Age: 67
End: 2022-05-10

## 2022-05-10 DIAGNOSIS — Z12.31 VISIT FOR SCREENING MAMMOGRAM: Primary | ICD-10-CM

## 2022-05-10 PROCEDURE — 77067 SCR MAMMO BI INCL CAD: CPT | Performed by: OBSTETRICS & GYNECOLOGY

## 2022-05-10 PROCEDURE — 77063 BREAST TOMOSYNTHESIS BI: CPT | Performed by: RADIOLOGY

## 2022-05-10 PROCEDURE — 77067 SCR MAMMO BI INCL CAD: CPT | Performed by: RADIOLOGY

## 2022-05-10 PROCEDURE — 77063 BREAST TOMOSYNTHESIS BI: CPT | Performed by: OBSTETRICS & GYNECOLOGY

## 2022-08-16 ENCOUNTER — OFFICE (AMBULATORY)
Dept: URBAN - METROPOLITAN AREA CLINIC 75 | Facility: CLINIC | Age: 67
End: 2022-08-16

## 2022-08-16 VITALS
SYSTOLIC BLOOD PRESSURE: 108 MMHG | DIASTOLIC BLOOD PRESSURE: 78 MMHG | OXYGEN SATURATION: 97 % | WEIGHT: 142 LBS | HEART RATE: 88 BPM | HEIGHT: 63 IN

## 2022-08-16 DIAGNOSIS — Z80.0 FAMILY HISTORY OF MALIGNANT NEOPLASM OF DIGESTIVE ORGANS: ICD-10-CM

## 2022-08-16 DIAGNOSIS — K57.30 DIVERTICULOSIS OF LARGE INTESTINE WITHOUT PERFORATION OR ABS: ICD-10-CM

## 2022-08-16 DIAGNOSIS — K52.832 LYMPHOCYTIC COLITIS: ICD-10-CM

## 2022-08-16 DIAGNOSIS — Z86.010 PERSONAL HISTORY OF COLONIC POLYPS: ICD-10-CM

## 2022-08-16 DIAGNOSIS — K21.00 GASTRO-ESOPHAGEAL REFLUX DISEASE WITH ESOPHAGITIS, WITHOUT B: ICD-10-CM

## 2022-08-16 DIAGNOSIS — K22.70 BARRETT'S ESOPHAGUS WITHOUT DYSPLASIA: ICD-10-CM

## 2022-08-16 DIAGNOSIS — K58.0 IRRITABLE BOWEL SYNDROME WITH DIARRHEA: ICD-10-CM

## 2022-08-16 PROCEDURE — 99214 OFFICE O/P EST MOD 30 MIN: CPT | Performed by: NURSE PRACTITIONER

## 2022-08-16 RX ORDER — OMEPRAZOLE 40 MG/1
CAPSULE, DELAYED RELEASE ORAL
Qty: 90 | Refills: 4 | Status: ACTIVE

## 2022-08-16 RX ORDER — BUDESONIDE 3 MG/1
CAPSULE ORAL
Qty: 90 | Refills: 0 | Status: COMPLETED
End: 2024-04-25

## 2022-09-07 DIAGNOSIS — E78.00 HYPERCHOLESTEREMIA: Chronic | ICD-10-CM

## 2022-09-07 RX ORDER — ATORVASTATIN CALCIUM 40 MG/1
40 TABLET, FILM COATED ORAL DAILY
Qty: 90 TABLET | Refills: 3 | Status: SHIPPED | OUTPATIENT
Start: 2022-09-07

## 2022-09-19 VITALS
SYSTOLIC BLOOD PRESSURE: 119 MMHG | HEART RATE: 77 BPM | HEART RATE: 82 BPM | SYSTOLIC BLOOD PRESSURE: 124 MMHG | RESPIRATION RATE: 20 BRPM | TEMPERATURE: 97.3 F | RESPIRATION RATE: 17 BRPM | DIASTOLIC BLOOD PRESSURE: 65 MMHG | OXYGEN SATURATION: 97 % | RESPIRATION RATE: 10 BRPM | HEART RATE: 99 BPM | SYSTOLIC BLOOD PRESSURE: 105 MMHG | HEART RATE: 88 BPM | DIASTOLIC BLOOD PRESSURE: 57 MMHG | RESPIRATION RATE: 12 BRPM | SYSTOLIC BLOOD PRESSURE: 107 MMHG | SYSTOLIC BLOOD PRESSURE: 116 MMHG | RESPIRATION RATE: 16 BRPM | DIASTOLIC BLOOD PRESSURE: 71 MMHG | SYSTOLIC BLOOD PRESSURE: 131 MMHG | HEART RATE: 81 BPM | DIASTOLIC BLOOD PRESSURE: 59 MMHG | OXYGEN SATURATION: 98 % | DIASTOLIC BLOOD PRESSURE: 75 MMHG | OXYGEN SATURATION: 99 % | TEMPERATURE: 97 F | SYSTOLIC BLOOD PRESSURE: 111 MMHG | HEART RATE: 83 BPM | HEIGHT: 63 IN | DIASTOLIC BLOOD PRESSURE: 78 MMHG | OXYGEN SATURATION: 100 % | WEIGHT: 142 LBS | RESPIRATION RATE: 18 BRPM

## 2022-09-20 ENCOUNTER — OFFICE (AMBULATORY)
Dept: URBAN - METROPOLITAN AREA PATHOLOGY 4 | Facility: PATHOLOGY | Age: 67
End: 2022-09-20

## 2022-09-20 ENCOUNTER — AMBULATORY SURGICAL CENTER (AMBULATORY)
Dept: URBAN - METROPOLITAN AREA SURGERY 17 | Facility: SURGERY | Age: 67
End: 2022-09-20

## 2022-09-20 DIAGNOSIS — K22.70 BARRETT'S ESOPHAGUS WITHOUT DYSPLASIA: ICD-10-CM

## 2022-09-20 DIAGNOSIS — K44.9 DIAPHRAGMATIC HERNIA WITHOUT OBSTRUCTION OR GANGRENE: ICD-10-CM

## 2022-09-20 DIAGNOSIS — K29.70 GASTRITIS, UNSPECIFIED, WITHOUT BLEEDING: ICD-10-CM

## 2022-09-20 DIAGNOSIS — K31.7 POLYP OF STOMACH AND DUODENUM: ICD-10-CM

## 2022-09-20 DIAGNOSIS — K21.9 GASTRO-ESOPHAGEAL REFLUX DISEASE WITHOUT ESOPHAGITIS: ICD-10-CM

## 2022-09-20 LAB
GI HISTOLOGY: A. SELECT: (no result)
GI HISTOLOGY: PDF REPORT: (no result)

## 2022-09-20 PROCEDURE — 88305 TISSUE EXAM BY PATHOLOGIST: CPT | Performed by: INTERNAL MEDICINE

## 2022-09-20 PROCEDURE — 43239 EGD BIOPSY SINGLE/MULTIPLE: CPT | Performed by: INTERNAL MEDICINE

## 2022-09-20 NOTE — SERVICEHPINOTES
I had the pleasure of seeing Ms. Urbina via telemedicine. As you know, she is a very pleasant 67-year-old  female patient of Dr. Pena with a history of GERD, Duran's esophagus lymphocytic colitis and colon polyps. She was last seen in our office 4/29/2021... diarrhea better on budesonide 3 mg 1 pill 3x/week. Here today for follow-up. She reports that she has 1 BM per day on budesonide 3mg 1 pill/week. She states that she will have loose stool on day 6 then take her budesonide on day 7 with resolution of diarrhea. If she stops it all together then the diarrhea becomes more persistent. She denies blood in her stool, melena, constipation, rectal pain/itching/burning, rectal protrusions or fecal leaking.Reflux is well controlled on omeprazole 40 mg once daily. She does not report any upper GI complaints at today's office visit. She denies dysphagia, odynophagia, heartburn, reflux, nausea, vomiting, abdominal pain, bloating or distention.Tried: pantoprazole (working well for > 10 years)DATA REVIEWED:brEGD 1/14/2009 showed grade B reflux esophagitis benign appearing esophageal stricture HH gastritis and duodenitis single small nodule in stomach area. Pathology revealed gastritis negative for H. pylori and celiac sprue gastric polyp was found to be hyperplastic.brCN 4/23/2019 IH. Pathology revealed lymphocytic colitis in the right and left colon.brEGD 8/27/2019 as below bx positive for nondysplastic short segment Duran's esophagus

## 2023-01-23 ENCOUNTER — HOSPITAL ENCOUNTER (OUTPATIENT)
Dept: GENERAL RADIOLOGY | Facility: HOSPITAL | Age: 68
Discharge: HOME | End: 2023-01-23
Admitting: INTERNAL MEDICINE
Payer: COMMERCIAL

## 2023-01-23 ENCOUNTER — OFFICE VISIT (OUTPATIENT)
Dept: INTERNAL MEDICINE | Facility: CLINIC | Age: 68
End: 2023-01-23
Payer: COMMERCIAL

## 2023-01-23 VITALS
DIASTOLIC BLOOD PRESSURE: 80 MMHG | HEIGHT: 63 IN | BODY MASS INDEX: 25.69 KG/M2 | TEMPERATURE: 97.8 F | WEIGHT: 145 LBS | SYSTOLIC BLOOD PRESSURE: 120 MMHG

## 2023-01-23 DIAGNOSIS — G89.29 CHRONIC LEFT SHOULDER PAIN: Primary | ICD-10-CM

## 2023-01-23 DIAGNOSIS — M25.512 CHRONIC LEFT SHOULDER PAIN: Primary | ICD-10-CM

## 2023-01-23 DIAGNOSIS — M25.512 CHRONIC LEFT SHOULDER PAIN: ICD-10-CM

## 2023-01-23 DIAGNOSIS — G89.29 CHRONIC LEFT SHOULDER PAIN: ICD-10-CM

## 2023-01-23 DIAGNOSIS — K22.70 BARRETT'S ESOPHAGUS WITHOUT DYSPLASIA: ICD-10-CM

## 2023-01-23 DIAGNOSIS — E78.00 HYPERCHOLESTEREMIA: Chronic | ICD-10-CM

## 2023-01-23 DIAGNOSIS — Z00.00 WELLNESS EXAMINATION: Primary | ICD-10-CM

## 2023-01-23 PROCEDURE — 73030 X-RAY EXAM OF SHOULDER: CPT

## 2023-01-23 PROCEDURE — 99397 PER PM REEVAL EST PAT 65+ YR: CPT | Performed by: INTERNAL MEDICINE

## 2023-01-23 RX ORDER — SCOLOPAMINE TRANSDERMAL SYSTEM 1 MG/1
1 PATCH, EXTENDED RELEASE TRANSDERMAL
Qty: 4 EACH | Refills: 0 | Status: SHIPPED | OUTPATIENT
Start: 2023-01-23

## 2023-01-23 NOTE — PROGRESS NOTES
Subjective        Chief Complaint   Patient presents with   • Annual Exam     With labs           Cara Maharaj is a 67 y.o. female who presents for    Patient Active Problem List   Diagnosis   • Hypercholesteremia   • Gastroesophageal reflux disease   • Jones's esophagus without dysplasia   • Lymphocytic colitis   • Herpes, vulvovaginitis       History of Present Illness     Her left shoulder has been hurting. It started last fall. She did not injure it. APAP does help a little. She has a small numb spot on her left knee for six months without back pain. She still takes budesonide and it helps her diarrhea. She only takes it once per week. She sees psychiatry every 6 months and is doing well emotionally. She denies chest pain or dyspnea. She started drinking a glass of wine 4 times per week since the summer. She had an EGD in Sept. She did not see ENT; tinnitus improved.  No Known Allergies    Current Outpatient Medications on File Prior to Visit   Medication Sig Dispense Refill   • ARIPiprazole (ABILIFY) 5 MG tablet TK 1 T PO D  5   • atorvastatin (LIPITOR) 40 MG tablet TAKE 1 TABLET BY MOUTH DAILY 90 tablet 3   • Budesonide (ENTOCORT EC) 3 MG 24 hr capsule 1 tab po daily  3   • DULoxetine (CYMBALTA) 60 MG capsule TK 2 CS PO ONCE A DAY  3   • metroNIDAZOLE (METROCREAM) 0.75 % cream Apply  topically to the appropriate area as directed 2 (Two) Times a Day.     • omeprazole (priLOSEC) 20 MG capsule Take 40 mg by mouth Daily.     • zoledronic acid (RECLAST) 5 MG/100ML solution infusion Infuse 5 mg into a venous catheter 1 (One) Time.       No current facility-administered medications on file prior to visit.       Past Medical History:   Diagnosis Date   • Alcohol abuse    • Allergic rhinitis    • Anxiety    • COVID-19 10/2021   • Depression    • Genital herpes 02/02/2021   • Osteopenia    • Rosacea    • SCC (squamous cell carcinoma), arm, right    • Ulcerative colitis (HCC)        Past Surgical History:   Procedure  Laterality Date   • CATARACT EXTRACTION, BILATERAL     • COLONOSCOPY  04/23/2019   • SHOULDER SURGERY     • TUBAL ABDOMINAL LIGATION         Family History   Problem Relation Age of Onset   • Lung cancer Mother    • Stroke Father    • Lung cancer Father    • Depression Father    • Melanoma Father    • Breast cancer Sister    • Asthma Sister    • Ulcerative colitis Sister    • No Known Problems Brother    • No Known Problems Daughter    • No Known Problems Son    • No Known Problems Maternal Grandmother    • No Known Problems Paternal Grandmother    • No Known Problems Maternal Aunt    • No Known Problems Paternal Aunt    • BRCA 1/2 Neg Hx    • Colon cancer Neg Hx    • Endometrial cancer Neg Hx    • Ovarian cancer Neg Hx    • Uterine cancer Neg Hx        Social History     Socioeconomic History   • Marital status:    Tobacco Use   • Smoking status: Never   • Smokeless tobacco: Never   Vaping Use   • Vaping Use: Never used   Substance and Sexual Activity   • Alcohol use: No     Comment: she has been sober 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 one glass of wine 4 times per week   • Drug use: No   • Sexual activity: Yes     Partners: Male     Birth control/protection: None           The following portions of the patient's history were reviewed and updated as appropriate: problem list, allergies, current medications, past medical history, past family history, past social history and past surgical history.    Review of Systems    Immunization History   Administered Date(s) Administered   • COVID-19 (PFIZER) BIVALENT BOOSTER 12+YRS 11/02/2022   • COVID-19 (PFIZER) PURPLE CAP 03/01/2021, 03/22/2021, 11/27/2021   • Covid-19 (Pfizer) Gray Cap 04/11/2022   • Fluad Quad 65+ 09/13/2019, 10/19/2020, 11/27/2021   • Fluzone High Dose =>65 Years ([employer] ONLY) 09/13/2019   • Fluzone High-Dose 65+yrs 11/02/2022   • Hepatitis A 05/22/2018, 01/22/2019   • Pneumococcal Conjugate 13-Valent (PCV13) 10/19/2020   • Pneumococcal Polysaccharide (PPSV23)  "01/20/2022   • Shingrix 10/22/2020, 12/22/2020   • Tdap 09/03/2014   • Zostavax 01/01/2011       Objective   Vitals:    01/23/23 1044   BP: 120/80   Temp: 97.8 °F (36.6 °C)   Weight: 65.8 kg (145 lb)   Height: 160 cm (62.99\")     Body mass index is 25.69 kg/m².  Physical Exam  Vitals reviewed.   Constitutional:       Appearance: She is well-developed.   HENT:      Head: Normocephalic and atraumatic.      Mouth/Throat:      Mouth: Mucous membranes are moist.      Pharynx: Oropharynx is clear.   Eyes:      Extraocular Movements: Extraocular movements intact.      Conjunctiva/sclera: Conjunctivae normal.      Pupils: Pupils are equal, round, and reactive to light.   Neck:      Thyroid: No thyromegaly.      Vascular: No carotid bruit.   Cardiovascular:      Rate and Rhythm: Normal rate and regular rhythm.      Heart sounds: Normal heart sounds. No murmur heard.  Pulmonary:      Effort: Pulmonary effort is normal.      Breath sounds: Normal breath sounds.   Abdominal:      General: There is no distension.      Palpations: Abdomen is soft. There is no mass.      Tenderness: There is no abdominal tenderness. There is no rebound.   Musculoskeletal:      Cervical back: Neck supple.      Comments: Left shoulder with decrease ROM above head. No crepitus.   Lymphadenopathy:      Cervical: No cervical adenopathy.   Skin:     General: Skin is warm.   Neurological:      Mental Status: She is alert.   Psychiatric:         Behavior: Behavior normal.         Procedures    Assessment & Plan   Diagnoses and all orders for this visit:    1. Wellness examination (Primary)    2. Chronic left shoulder pain  Comments:  APAP one gm every 8 hours  Orders:  -     XR Shoulder 2+ View Left; Future  -     Ambulatory Referral to Physical Therapy    3. Jones's esophagus without dysplasia    4. Hypercholesteremia  -     Comprehensive Metabolic Panel; Future  -     Lipid Panel With / Chol / HDL Ratio; Future    Other orders  -     Scopolamine 1 " MG/3DAYS patch; Place 1 patch on the skin as directed by provider Every 72 (Seventy-Two) Hours.  Dispense: 4 each; Refill: 0               Discussed use of wine given past h/o alcoholism. Reviewed labs. Discussed exercising 150 minutes per week. Cscope, MMG and DEXA are UTD. Scopolamine for cruise.  Return in about 1 year (around 1/23/2024) for Annual physical, Lab Before FUP.

## 2023-03-17 ENCOUNTER — PROCEDURE VISIT (OUTPATIENT)
Dept: OBSTETRICS AND GYNECOLOGY | Age: 68
End: 2023-03-17
Payer: COMMERCIAL

## 2023-03-17 ENCOUNTER — OFFICE (AMBULATORY)
Dept: URBAN - METROPOLITAN AREA CLINIC 76 | Facility: CLINIC | Age: 68
End: 2023-03-17

## 2023-03-17 ENCOUNTER — OFFICE VISIT (OUTPATIENT)
Dept: OBSTETRICS AND GYNECOLOGY | Age: 68
End: 2023-03-17
Payer: COMMERCIAL

## 2023-03-17 VITALS
SYSTOLIC BLOOD PRESSURE: 122 MMHG | WEIGHT: 140.4 LBS | BODY MASS INDEX: 24.88 KG/M2 | HEIGHT: 63 IN | DIASTOLIC BLOOD PRESSURE: 78 MMHG

## 2023-03-17 VITALS
HEIGHT: 63 IN | HEART RATE: 82 BPM | OXYGEN SATURATION: 97 % | DIASTOLIC BLOOD PRESSURE: 72 MMHG | SYSTOLIC BLOOD PRESSURE: 115 MMHG | WEIGHT: 141 LBS

## 2023-03-17 DIAGNOSIS — K21.9 GASTRO-ESOPHAGEAL REFLUX DISEASE WITHOUT ESOPHAGITIS: ICD-10-CM

## 2023-03-17 DIAGNOSIS — Z83.71 FAMILY HISTORY OF COLONIC POLYPS: ICD-10-CM

## 2023-03-17 DIAGNOSIS — Z12.31 VISIT FOR SCREENING MAMMOGRAM: Primary | ICD-10-CM

## 2023-03-17 DIAGNOSIS — Z80.0 FAMILY HISTORY OF MALIGNANT NEOPLASM OF DIGESTIVE ORGANS: ICD-10-CM

## 2023-03-17 DIAGNOSIS — Z12.31 ENCOUNTER FOR SCREENING MAMMOGRAM FOR MALIGNANT NEOPLASM OF BREAST: ICD-10-CM

## 2023-03-17 DIAGNOSIS — K29.70 GASTRITIS, UNSPECIFIED, WITHOUT BLEEDING: ICD-10-CM

## 2023-03-17 DIAGNOSIS — Z01.419 ENCOUNTER FOR GYNECOLOGICAL EXAMINATION: Primary | ICD-10-CM

## 2023-03-17 DIAGNOSIS — K22.2 ESOPHAGEAL OBSTRUCTION: ICD-10-CM

## 2023-03-17 DIAGNOSIS — K57.30 DIVERTICULOSIS OF LARGE INTESTINE WITHOUT PERFORATION OR ABS: ICD-10-CM

## 2023-03-17 DIAGNOSIS — K44.9 DIAPHRAGMATIC HERNIA WITHOUT OBSTRUCTION OR GANGRENE: ICD-10-CM

## 2023-03-17 DIAGNOSIS — K52.9 NONINFECTIVE GASTROENTERITIS AND COLITIS, UNSPECIFIED: ICD-10-CM

## 2023-03-17 DIAGNOSIS — K58.0 IRRITABLE BOWEL SYNDROME WITH DIARRHEA: ICD-10-CM

## 2023-03-17 DIAGNOSIS — K22.70 BARRETT'S ESOPHAGUS WITHOUT DYSPLASIA: ICD-10-CM

## 2023-03-17 DIAGNOSIS — A60.04 HERPES, VULVOVAGINITIS: ICD-10-CM

## 2023-03-17 PROCEDURE — 77063 BREAST TOMOSYNTHESIS BI: CPT | Performed by: OBSTETRICS & GYNECOLOGY

## 2023-03-17 PROCEDURE — 99213 OFFICE O/P EST LOW 20 MIN: CPT | Performed by: INTERNAL MEDICINE

## 2023-03-17 PROCEDURE — 77067 SCR MAMMO BI INCL CAD: CPT | Performed by: OBSTETRICS & GYNECOLOGY

## 2023-03-17 PROCEDURE — 99397 PER PM REEVAL EST PAT 65+ YR: CPT | Performed by: OBSTETRICS & GYNECOLOGY

## 2023-03-17 RX ORDER — OMEPRAZOLE 40 MG/1
CAPSULE, DELAYED RELEASE ORAL
COMMUNITY
Start: 2023-02-04

## 2023-03-17 RX ORDER — VALACYCLOVIR HYDROCHLORIDE 500 MG/1
500 TABLET, FILM COATED ORAL 2 TIMES DAILY
Qty: 6 TABLET | Refills: 4 | Status: SHIPPED | OUTPATIENT
Start: 2023-03-17 | End: 2023-03-20

## 2023-03-17 NOTE — SERVICEHPINOTES
Ms. Urbina is here for follow-up and doing well.  She has a history of short segment Duran's esophagus.  I did an EGD and biopsy on her in September of last year.  Biopsy showed nondysplastic Duran's.  Her reflux is controlled.  There is no dysphagia, odynophagia nausea or vomiting.  There is no melena or hematemesis.  She is not overweight.  She's not a smoker.
br
br She has no lower GI complaints.  There is no diarrhea, constipation or bleeding.  She is up-to-date in terms of colonoscopy.  I reviewed her chart and she'll be due for a colonoscopy in April of next year.  She tells me there is no change in her past medical or past surgical history.

## 2023-03-17 NOTE — PROGRESS NOTES
".  Subjective     Chief Complaint   Patient presents with   • Gynecologic Exam     Annual exam, Last Pap 2021 NEG, HPV NEG, Last Mammogram 05/10/2022, DEXA today, Colonoscopy 2019, Pt has no complaints today        History of Present Illness    Cara Maharaj is a 67 y.o.  who presents for annual exam.  She denies any gyn issues today; she denies vag bleeding, pelvic pain or urinary incontinence     Obstetric History:  OB History        2    Para   2    Term   2            AB        Living           SAB        IAB        Ectopic        Molar        Multiple        Live Births                   Menstrual History:     No LMP recorded (lmp unknown). Patient is postmenopausal.         Current contraception: post menopausal status  History of abnormal Pap smear: yes - had colpo with neg f/u  Received Gardasil immunization: no  Perform regular self breast exam: yes  Family history of uterine or ovarian cancer: no  Family History of colon cancer: no  Family history of breast cancer: yes - sister after menopause    Mammogram: ordered, due in May  Colonoscopy: up to date, done  with f/u 5 yrs per surgeon  DEXA: done today and reviewed with pt    Exercise: moderately active  Calcium/Vitamin D: adequate intake    The following portions of the patient's history were reviewed and updated as appropriate: allergies, current medications, past family history, past medical history, past social history, past surgical history and problem list.    Review of Systems    Review of Systems   Constitutional: Negative for fatigue.   Respiratory: Negative for shortness of breath.    Gastrointestinal: Negative for abdominal pain.   Genitourinary: Negative for vag bleeding, pelvic pain or urinary incontinence  Neurological: Negative for headache.   Psychiatric/Behavioral: Negative for dysphoric mood.         Objective   Physical Exam    /78   Ht 160 cm (62.99\")   Wt 63.7 kg (140 lb 6.4 oz)   LMP  " (LMP Unknown)   BMI 24.88 kg/m²   General:   Alert, in no distress   Heart: regular rate and rhythm   Lungs: clear to auscultation bilaterally   Breast: Inspection negative; no masses, retractions, nipple discharge or axillary adenopathy in either breast   Neck: supple   Abdomen: Soft, no tenderness or guarding   Pelvis: External genitalia: normal general appearance  Urinary system: urethral meatus normal  Vaginal: atrophic mucosa without prolapse or lesions  Cervix: normal appearance  Adnexa: no masses or tenderness  Uterus: normal, nontender   Extremities: Normal without edema   Neurologic: Alert and oriented   Psychiatric: Normal affect, judgment and mood     Assessment & Plan   Diagnoses and all orders for this visit:    1. Encounter for gynecological examination (Primary)    2. Herpes, vulvovaginitis    3. Encounter for screening mammogram for malignant neoplasm of breast  -     Mammo Screening Digital Tomosynthesis Bilateral With CAD; Future    Other orders  -     valACYclovir (Valtrex) 500 MG tablet; Take 1 tablet by mouth 2 (Two) Times a Day for 3 days. As needed  Dispense: 6 tablet; Refill: 4        All questions answered.  Breast self exam technique reviewed and patient encouraged to perform self-exam monthly.  Discussed healthy lifestyle modifications.  Recommended 30 minutes of aerobic exercise five times per week.  Discussed calcium needs to prevent osteoporosis.  Pap def as up to date and pt agrees. Pt has mammogram booked in May and order placed. Advised her to call if she does not receive results following  Bone density improved and reviewed with pt. She notes she continues Reclast through osteoporsis center, provided copy of bone density for her to review with them.

## 2023-05-12 ENCOUNTER — PROCEDURE VISIT (OUTPATIENT)
Dept: OBSTETRICS AND GYNECOLOGY | Age: 68
End: 2023-05-12
Payer: COMMERCIAL

## 2023-05-12 DIAGNOSIS — Z12.31 VISIT FOR SCREENING MAMMOGRAM: Primary | ICD-10-CM

## 2023-08-17 ENCOUNTER — TELEPHONE (OUTPATIENT)
Dept: INTERNAL MEDICINE | Facility: CLINIC | Age: 68
End: 2023-08-17

## 2023-08-17 ENCOUNTER — TELEMEDICINE (OUTPATIENT)
Dept: INTERNAL MEDICINE | Facility: CLINIC | Age: 68
End: 2023-08-17
Payer: MEDICARE

## 2023-08-17 DIAGNOSIS — U07.1 COVID-19 VIRUS INFECTION: Primary | ICD-10-CM

## 2023-08-17 PROCEDURE — 99213 OFFICE O/P EST LOW 20 MIN: CPT | Performed by: INTERNAL MEDICINE

## 2023-08-17 RX ORDER — BENZONATATE 100 MG/1
100 CAPSULE ORAL 3 TIMES DAILY PRN
Qty: 21 CAPSULE | Refills: 0 | Status: SHIPPED | OUTPATIENT
Start: 2023-08-17

## 2023-08-17 NOTE — PROGRESS NOTES
Subjective        Chief Complaint   Patient presents with    Covid-19 Home Monitoring Video Visit           Cara Maharaj is a 68 y.o. female who presents for    Patient Active Problem List   Diagnosis    Hypercholesteremia    Gastroesophageal reflux disease    Jones's esophagus without dysplasia    Lymphocytic colitis    Herpes, vulvovaginitis       History of Present Illness   She started sneezing with a runny nose and aches yesterday. She denies fever. She has a cough without dyspnea. She tested positive for COVID this am. She is off abilify and cymbalta.  She was traveling in [place].    No Known Allergies  Mode of Visit: Video  Location of patient: home  Location of provider: [facility]  You have chosen to receive care through a telehealth visit.  Does the patient consent to use a video/audio connection for your medical care today? Yes  The visit included audio and video interaction. No technical issues occurred during this visit.    Current Outpatient Medications on File Prior to Visit   Medication Sig Dispense Refill    atorvastatin (LIPITOR) 40 MG tablet TAKE 1 TABLET BY MOUTH DAILY 90 tablet 3    Budesonide (ENTOCORT EC) 3 MG 24 hr capsule 1 tab po daily  3    metroNIDAZOLE (METROCREAM) 0.75 % cream Apply  topically to the appropriate area as directed 2 (Two) Times a Day.      omeprazole (priLOSEC) 40 MG capsule TAKE 1 CAPSULE BY MOUTH EVERY DAY BEFORE MEALS      zoledronic acid (RECLAST) 5 MG/100ML solution infusion Infuse 100 mL into a venous catheter 1 (One) Time.      Scopolamine 1 MG/3DAYS patch Place 1 patch on the skin as directed by provider Every 72 (Seventy-Two) Hours. 4 each 0    [DISCONTINUED] ARIPiprazole (ABILIFY) 5 MG tablet TK 1 T PO D  5    [DISCONTINUED] DULoxetine (CYMBALTA) 60 MG capsule TK 2 CS PO ONCE A DAY  3     No current facility-administered medications on file prior to visit.       Past Medical History:   Diagnosis Date    Alcohol abuse     Allergic rhinitis     Anxiety      COVID-19 10/2021    Depression     Genital herpes 02/02/2021    Osteopenia     Rosacea     SCC (squamous cell carcinoma), arm, right     Ulcerative colitis        Past Surgical History:   Procedure Laterality Date    CATARACT EXTRACTION, BILATERAL      COLONOSCOPY  04/23/2019    SHOULDER SURGERY      TUBAL ABDOMINAL LIGATION         Family History   Problem Relation Age of Onset    Lung cancer Mother     Stroke Father     Lung cancer Father     Depression Father     Melanoma Father     Breast cancer Sister     Asthma Sister     Ulcerative colitis Sister     No Known Problems Brother     No Known Problems Daughter     No Known Problems Son     No Known Problems Maternal Grandmother     No Known Problems Paternal Grandmother     No Known Problems Maternal Aunt     No Known Problems Paternal Aunt     BRCA 1/2 Neg Hx     Colon cancer Neg Hx     Endometrial cancer Neg Hx     Ovarian cancer Neg Hx     Uterine cancer Neg Hx        Social History     Socioeconomic History    Marital status:    Tobacco Use    Smoking status: Never    Smokeless tobacco: Never   Vaping Use    Vaping Use: Never used   Substance and Sexual Activity    Alcohol use: No     Comment: she has been sober 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 one glass of wine 4 times per week    Drug use: No    Sexual activity: Yes     Partners: Male     Birth control/protection: None           The following portions of the patient's history were reviewed and updated as appropriate: problem list, allergies, current medications, past medical history, past family history, past social history, and past surgical history.    Review of Systems    Immunization History   Administered Date(s) Administered    COVID-19 (PFIZER) BIVALENT 12+YRS 11/02/2022    COVID-19 (PFIZER) Purple Cap Monovalent 03/01/2021, 03/22/2021, 11/27/2021    Covid-19 (Pfizer) Gray Cap Monovalent 04/11/2022    Fluad Quad 65+ 09/13/2019, 10/19/2020, 11/27/2021    Fluzone High Dose =>65 Years ([employer]ax[employer] ONLY) 09/13/2019     Fluzone High-Dose 65+yrs 11/02/2022    Hepatitis A 05/22/2018, 01/22/2019    Pneumococcal Conjugate 13-Valent (PCV13) 10/19/2020    Pneumococcal Polysaccharide (PPSV23) 01/20/2022    Shingrix 10/22/2020, 12/22/2020    Tdap 09/03/2014    Zostavax 01/01/2011       Objective   There were no vitals filed for this visit.  There is no height or weight on file to calculate BMI.  Physical Exam  Constitutional:       Appearance: She is normal weight.   Neurological:      Mental Status: She is oriented to person, place, and time.   Psychiatric:         Mood and Affect: Mood normal.       Procedures    Assessment & Plan   Diagnoses and all orders for this visit:    1. COVID-19 virus infection (Primary)  -     benzonatate (Tessalon Perles) 100 MG capsule; Take 1 capsule by mouth 3 (Three) Times a Day As Needed for Cough.  Dispense: 21 capsule; Refill: 0        She just tested positive for COVID. Answered questions about Paxlovid; only risk is age but her symptoms are extremely mild and just started yesterday. If she gets worse, she will call and we can send in Paxlovid. Reviewed quarantine.           No follow-ups on file.

## 2023-09-18 DIAGNOSIS — E78.00 HYPERCHOLESTEREMIA: Chronic | ICD-10-CM

## 2023-09-18 RX ORDER — ATORVASTATIN CALCIUM 40 MG/1
40 TABLET, FILM COATED ORAL DAILY
Qty: 90 TABLET | Refills: 3 | Status: SHIPPED | OUTPATIENT
Start: 2023-09-18

## 2024-01-29 ENCOUNTER — OFFICE VISIT (OUTPATIENT)
Dept: INTERNAL MEDICINE | Facility: CLINIC | Age: 69
End: 2024-01-29
Payer: MEDICARE

## 2024-01-29 VITALS
WEIGHT: 139.8 LBS | SYSTOLIC BLOOD PRESSURE: 128 MMHG | DIASTOLIC BLOOD PRESSURE: 76 MMHG | BODY MASS INDEX: 24.77 KG/M2 | HEIGHT: 63 IN

## 2024-01-29 DIAGNOSIS — Z00.00 ENCOUNTER FOR SUBSEQUENT ANNUAL WELLNESS VISIT (AWV) IN MEDICARE PATIENT: Primary | ICD-10-CM

## 2024-01-29 DIAGNOSIS — K22.70 BARRETT'S ESOPHAGUS WITHOUT DYSPLASIA: ICD-10-CM

## 2024-01-29 DIAGNOSIS — E78.00 HYPERCHOLESTEREMIA: Chronic | ICD-10-CM

## 2024-01-29 PROCEDURE — 1159F MED LIST DOCD IN RCRD: CPT | Performed by: INTERNAL MEDICINE

## 2024-01-29 PROCEDURE — G0439 PPPS, SUBSEQ VISIT: HCPCS | Performed by: INTERNAL MEDICINE

## 2024-01-29 PROCEDURE — 1160F RVW MEDS BY RX/DR IN RCRD: CPT | Performed by: INTERNAL MEDICINE

## 2024-01-29 PROCEDURE — 1170F FXNL STATUS ASSESSED: CPT | Performed by: INTERNAL MEDICINE

## 2024-01-29 RX ORDER — DIPHENOXYLATE HYDROCHLORIDE AND ATROPINE SULFATE 2.5; .025 MG/1; MG/1
TABLET ORAL DAILY
COMMUNITY

## 2024-01-29 NOTE — PROGRESS NOTES
The ABCs of the Annual Wellness Visit  Subsequent Medicare Wellness Visit    Subjective    Cara Maharaj is a 68 y.o. female who presents for a Subsequent Medicare Wellness Visit.  She has had Reclast twice with last dose in 2022. She denies chest pain, dyspnea, nausea or abdominal pain. She is stable emotionally off her anti depressants. She eats smaller amounts. She walks 2 miles 5-6 days per week. She does see derm yearly.  The following portions of the patient's history were reviewed and   updated as appropriate: allergies, current medications, past family history, past medical history, past social history, past surgical history, and problem list.    Compared to one year ago, the patient feels her physical   health is the same.    Compared to one year ago, the patient feels her mental   health is the same.    Recent Hospitalizations:  She was not admitted to the hospital during the last year.       Current Medical Providers:  Patient Care Team:  Francois Mock MD as PCP - General (Internal Medicine)    Outpatient Medications Prior to Visit   Medication Sig Dispense Refill    atorvastatin (LIPITOR) 40 MG tablet TAKE 1 TABLET BY MOUTH DAILY 90 tablet 3    Budesonide (ENTOCORT EC) 3 MG 24 hr capsule 1 tab po daily  3    Calcium Citrate-Vitamin D (CALCIUM CITRATE + PO) Take  by mouth.      metroNIDAZOLE (METROCREAM) 0.75 % cream Apply  topically to the appropriate area as directed 2 (Two) Times a Day.      multivitamin (MULTI VITAMIN PO) Take  by mouth Daily.      omeprazole (priLOSEC) 40 MG capsule TAKE 1 CAPSULE BY MOUTH EVERY DAY BEFORE MEALS      Scopolamine 1 MG/3DAYS patch Place 1 patch on the skin as directed by provider Every 72 (Seventy-Two) Hours. 4 each 0    zoledronic acid (RECLAST) 5 MG/100ML solution infusion Infuse 100 mL into a venous catheter 1 (One) Time. (Patient not taking: Reported on 1/29/2024)      benzonatate (Tessalon Perles) 100 MG capsule Take 1 capsule by mouth 3 (Three) Times a Day  "As Needed for Cough. (Patient not taking: Reported on 2024) 21 capsule 0     No facility-administered medications prior to visit.       No opioid medication identified on active medication list. I have reviewed chart for other potential  high risk medication/s and harmful drug interactions in the elderly.        Aspirin is not on active medication list.  Aspirin use is not indicated based on review of current medical condition/s. Risk of harm outweighs potential benefits.  .    Patient Active Problem List   Diagnosis    Hypercholesteremia    Gastroesophageal reflux disease    Jones's esophagus without dysplasia    Lymphocytic colitis    Herpes, vulvovaginitis     Advance Care Planning   Advance Care Planning     Advance Directive is not on file.  ACP discussion was held with the patient during this visit. Patient has an advance directive (not in EMR), copy requested.     Objective    Vitals:    24 1053   BP: 128/76   Weight: 63.4 kg (139 lb 12.8 oz)   Height: 160 cm (62.99\")     Estimated body mass index is 24.77 kg/m² as calculated from the following:    Height as of this encounter: 160 cm (62.99\").    Weight as of this encounter: 63.4 kg (139 lb 12.8 oz).    BMI is within normal parameters. No other follow-up for BMI required.      Does the patient have evidence of cognitive impairment? No    Lab Results   Component Value Date    CHLPL 220 (H) 2024    TRIG 146 2024    HDL 87 (H) 2024     (H) 2024    VLDL 25 2024        HEALTH RISK ASSESSMENT    Smoking Status:  Social History     Tobacco Use   Smoking Status Former    Packs/day: 1.50    Years: 14.00    Additional pack years: 0.00    Total pack years: 21.00    Types: Cigarettes    Quit date:     Years since quittin.1   Smokeless Tobacco Never     Alcohol Consumption:  Social History     Substance and Sexual Activity   Alcohol Use No    Comment: she has been sober 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 one glass of wine 4 times per " week     Fall Risk Screen:    SRUTHIADI Fall Risk Assessment was completed, and patient is at LOW risk for falls.Assessment completed on:2024    Depression Screenin/29/2024    10:57 AM   PHQ-2/PHQ-9 Depression Screening   Little Interest or Pleasure in Doing Things 0-->not at all   Feeling Down, Depressed or Hopeless 0-->not at all   PHQ-9: Brief Depression Severity Measure Score 0       Health Habits and Functional and Cognitive Screenin/29/2024    10:58 AM   Functional & Cognitive Status   Do you have difficulty preparing food and eating? No   Do you have difficulty bathing yourself, getting dressed or grooming yourself? No   Do you have difficulty using the toilet? No   Do you have difficulty moving around from place to place? No   Do you have trouble with steps or getting out of a bed or a chair? No   Current Diet Well Balanced Diet   Dental Exam Up to date   Eye Exam Up to date   Exercise (times per week) 6 times per week   Current Exercises Include Walking   Do you need help using the phone?  No   Are you deaf or do you have serious difficulty hearing?  No   Do you need help to go to places out of walking distance? No   Do you need help shopping? No   Do you need help preparing meals?  No   Do you need help with housework?  No   Do you need help with laundry? No   Do you need help taking your medications? No   Do you need help managing money? No   Do you ever drive or ride in a car without wearing a seat belt? No   Have you felt unusual stress, anger or loneliness in the last month? No   Who do you live with? Spouse   If you need help, do you have trouble finding someone available to you? No   Have you been bothered in the last four weeks by sexual problems? No   Do you have difficulty concentrating, remembering or making decisions? No       Age-appropriate Screening Schedule:  Refer to the list below for future screening recommendations based on patient's age, sex and/or medical  "conditions. Orders for these recommended tests are listed in the plan section. The patient has been provided with a written plan.    Health Maintenance   Topic Date Due    TDAP/TD VACCINES (2 - Td or Tdap) 09/03/2024    LIPID PANEL  01/22/2025    ANNUAL WELLNESS VISIT  01/29/2025    DXA SCAN  03/17/2025    MAMMOGRAM  05/12/2025    COLORECTAL CANCER SCREENING  04/23/2029    HEPATITIS C SCREENING  Completed    COVID-19 Vaccine  Completed    INFLUENZA VACCINE  Completed    Pneumococcal Vaccine 65+  Completed    ZOSTER VACCINE  Completed                  CMS Preventative Services Quick Reference  Risk Factors Identified During Encounter  None Identified  The above risks/problems have been discussed with the patient.  Pertinent information has been shared with the patient in the After Visit Summary.  An After Visit Summary and PPPS were made available to the patient.    Follow Up:   Next Medicare Wellness visit to be scheduled in 1 year.       Additional E&M Note during same encounter follows:  Patient has multiple medical problems which are significant and separately identifiable that require additional work above and beyond the Medicare Wellness Visit.      Chief Complaint  Medicare Wellness-subsequent    Subjective        HPI  Cara Maharaj is also being seen today for Medicare Wellness and hyperlipidemia  She has had Reclast twice with last dose in 2022. She denies chest pain, dyspnea, nausea or abdominal pain. She is stable emotionally off her anti depressants. She eats smaller amounts. She walks 2 miles 5-6 days per week. She does see derm yearly.       Objective   Vital Signs:  /76   Ht 160 cm (62.99\")   Wt 63.4 kg (139 lb 12.8 oz)   BMI 24.77 kg/m²     Physical Exam  Vitals reviewed.   Constitutional:       Appearance: She is well-developed.   HENT:      Head: Normocephalic and atraumatic.   Neck:      Thyroid: No thyromegaly.      Vascular: No carotid bruit.   Cardiovascular:      Rate and Rhythm: " Normal rate and regular rhythm.      Heart sounds: Normal heart sounds, S1 normal and S2 normal.   Pulmonary:      Effort: Pulmonary effort is normal.      Breath sounds: Normal breath sounds.   Abdominal:      Palpations: Abdomen is soft. There is no hepatomegaly or splenomegaly.   Lymphadenopathy:      Cervical: No cervical adenopathy.   Skin:     General: Skin is warm.   Neurological:      Mental Status: She is alert.   Psychiatric:         Behavior: Behavior normal.                         Assessment and Plan   Diagnoses and all orders for this visit:    1. Encounter for subsequent annual wellness visit (AWV) in Medicare patient (Primary)    2. Hypercholesteremia  -     Comprehensive Metabolic Panel; Future  -     Lipid Panel With / Chol / HDL Ratio; Future    3. Jones's esophagus without dysplasia  Comments:  Sees GI in March             Follow Up   Return in about 1 year (around 1/29/2025) for Medicare Wellness, Lab Before FUP.  Patient was given instructions and counseling regarding her condition or for health maintenance advice. Please see specific information pulled into the AVS if appropriate.         She will Dr. Solano office what to do next with Reclast since I cannot tell who was infusing her. She will be due for a cscope this year with Dr. Johnson; she sees him in March. Reviewed cmp and flp.

## 2024-03-11 ENCOUNTER — OFFICE (AMBULATORY)
Dept: URBAN - METROPOLITAN AREA CLINIC 76 | Facility: CLINIC | Age: 69
End: 2024-03-11
Payer: COMMERCIAL

## 2024-03-11 VITALS
HEART RATE: 71 BPM | OXYGEN SATURATION: 99 % | HEIGHT: 63 IN | DIASTOLIC BLOOD PRESSURE: 84 MMHG | SYSTOLIC BLOOD PRESSURE: 122 MMHG | WEIGHT: 143 LBS

## 2024-03-11 DIAGNOSIS — K22.70 BARRETT'S ESOPHAGUS WITHOUT DYSPLASIA: ICD-10-CM

## 2024-03-11 DIAGNOSIS — K52.832 LYMPHOCYTIC COLITIS: ICD-10-CM

## 2024-03-11 DIAGNOSIS — K21.9 GASTRO-ESOPHAGEAL REFLUX DISEASE WITHOUT ESOPHAGITIS: ICD-10-CM

## 2024-03-11 DIAGNOSIS — Z83.719 FAMILY HISTORY OF COLON POLYPS, UNSPECIFIED: ICD-10-CM

## 2024-03-11 DIAGNOSIS — Z86.010 PERSONAL HISTORY OF COLONIC POLYPS: ICD-10-CM

## 2024-03-11 PROBLEM — K44.9 DIAPHRAGMATIC HERNIA WITHOUT OBSTRUCTION OR GANGRENE: Status: ACTIVE | Noted: 2019-08-27

## 2024-03-11 PROBLEM — K22.2 ESOPHAGEAL OBSTRUCTION: Status: ACTIVE | Noted: 2019-08-27

## 2024-03-11 PROBLEM — K29.70 GASTRITIS, UNSPECIFIED, WITHOUT BLEEDING: Status: ACTIVE | Noted: 2019-08-27

## 2024-03-11 PROBLEM — K31.7 POLYP OF STOMACH AND DUODENUM: Status: ACTIVE | Noted: 2019-08-27

## 2024-03-11 PROBLEM — K31.89 OTHER DISEASES OF STOMACH AND DUODENUM: Status: ACTIVE | Noted: 2022-09-20

## 2024-03-11 PROCEDURE — 99214 OFFICE O/P EST MOD 30 MIN: CPT | Performed by: NURSE PRACTITIONER

## 2024-04-18 VITALS
SYSTOLIC BLOOD PRESSURE: 122 MMHG | RESPIRATION RATE: 21 BRPM | OXYGEN SATURATION: 100 % | SYSTOLIC BLOOD PRESSURE: 109 MMHG | HEART RATE: 115 BPM | TEMPERATURE: 97.1 F | HEART RATE: 80 BPM | WEIGHT: 132 LBS | RESPIRATION RATE: 19 BRPM | TEMPERATURE: 97.3 F | DIASTOLIC BLOOD PRESSURE: 61 MMHG | DIASTOLIC BLOOD PRESSURE: 74 MMHG | OXYGEN SATURATION: 97 % | RESPIRATION RATE: 12 BRPM | SYSTOLIC BLOOD PRESSURE: 107 MMHG | RESPIRATION RATE: 24 BRPM | DIASTOLIC BLOOD PRESSURE: 52 MMHG | SYSTOLIC BLOOD PRESSURE: 118 MMHG | HEART RATE: 81 BPM | RESPIRATION RATE: 18 BRPM | OXYGEN SATURATION: 98 % | SYSTOLIC BLOOD PRESSURE: 99 MMHG | HEIGHT: 63 IN | RESPIRATION RATE: 15 BRPM | SYSTOLIC BLOOD PRESSURE: 116 MMHG | HEART RATE: 66 BPM | DIASTOLIC BLOOD PRESSURE: 54 MMHG | TEMPERATURE: 97 F | DIASTOLIC BLOOD PRESSURE: 71 MMHG | DIASTOLIC BLOOD PRESSURE: 77 MMHG | HEART RATE: 69 BPM | RESPIRATION RATE: 20 BRPM | DIASTOLIC BLOOD PRESSURE: 80 MMHG | HEART RATE: 65 BPM | HEART RATE: 103 BPM | HEART RATE: 79 BPM | RESPIRATION RATE: 14 BRPM | HEART RATE: 89 BPM | HEART RATE: 75 BPM | OXYGEN SATURATION: 99 % | SYSTOLIC BLOOD PRESSURE: 121 MMHG | RESPIRATION RATE: 16 BRPM

## 2024-04-25 ENCOUNTER — OFFICE (AMBULATORY)
Dept: URBAN - METROPOLITAN AREA PATHOLOGY 4 | Facility: PATHOLOGY | Age: 69
End: 2024-04-25
Payer: COMMERCIAL

## 2024-04-25 ENCOUNTER — AMBULATORY SURGICAL CENTER (AMBULATORY)
Dept: URBAN - METROPOLITAN AREA SURGERY 17 | Facility: SURGERY | Age: 69
End: 2024-04-25
Payer: COMMERCIAL

## 2024-04-25 DIAGNOSIS — Z80.0 FAMILY HISTORY OF MALIGNANT NEOPLASM OF DIGESTIVE ORGANS: ICD-10-CM

## 2024-04-25 DIAGNOSIS — R19.4 CHANGE IN BOWEL HABIT: ICD-10-CM

## 2024-04-25 DIAGNOSIS — K52.89 OTHER SPECIFIED NONINFECTIVE GASTROENTERITIS AND COLITIS: ICD-10-CM

## 2024-04-25 DIAGNOSIS — D12.3 BENIGN NEOPLASM OF TRANSVERSE COLON: ICD-10-CM

## 2024-04-25 DIAGNOSIS — K52.9 NONINFECTIVE GASTROENTERITIS AND COLITIS, UNSPECIFIED: ICD-10-CM

## 2024-04-25 DIAGNOSIS — K57.30 DIVERTICULOSIS OF LARGE INTESTINE WITHOUT PERFORATION OR ABS: ICD-10-CM

## 2024-04-25 PROBLEM — K63.89 OTHER SPECIFIED DISEASES OF INTESTINE: Status: ACTIVE | Noted: 2024-04-25

## 2024-04-25 PROBLEM — K63.5 POLYP OF COLON: Status: ACTIVE | Noted: 2024-04-25

## 2024-04-25 LAB
GI HISTOLOGY: A. SPLENIC FLEXURE: (no result)
GI HISTOLOGY: B. RANDOM RIGHT COLON: (no result)
GI HISTOLOGY: C. RANDOM LEFT COLON: (no result)
GI HISTOLOGY: PDF REPORT: (no result)

## 2024-04-25 PROCEDURE — 45380 COLONOSCOPY AND BIOPSY: CPT | Mod: 59 | Performed by: INTERNAL MEDICINE

## 2024-04-25 PROCEDURE — 45385 COLONOSCOPY W/LESION REMOVAL: CPT | Performed by: INTERNAL MEDICINE

## 2024-04-25 PROCEDURE — 88305 TISSUE EXAM BY PATHOLOGIST: CPT | Performed by: PATHOLOGY

## 2024-04-25 NOTE — SERVICEHPINOTES
I had the pleasure of seeing Ms. Urbina via telemedicine. As you know, she is a very pleasant 68-year-old  female patient of Dr. Pena with a history of GERD, Duran's esophagus, lymphocytic colitis and colon polyps. She was last seen in our office 3/17/2023 ... was doing well on budesonide 3 mg 1 pill 3x/week. Here today for follow-up. She reports that she has "total diarrhea" with 7-8 BMs per day despite budesonide 3mg QOD. Sx started 1 month ago while on a trip to [place] and has persisted. Bowels will move right after eating and most certainly 1-2 hours after eating, will also wake her from sleep. She denies blood in her stool, melena, constipation, rectal pain/itching/burning, rectal protrusions or fecal leaking.Reflux is well controlled on omeprazole 40mg once daily. She does not report any upper GI complaints at today's office visit. She denies dysphagia, odynophagia, heartburn, reflux, nausea, vomiting, abdominal pain, bloating or distention.Tried: pantoprazole (working well for > 10 years), omeprazole DATA REVIEWED:brEGD 1/14/2009 showed grade B reflux esophagitis benign appearing esophageal stricture HH gastritis and duodenitis single small nodule in stomach area. Pathology revealed gastritis negative for H. pylori and celiac sprue gastric polyp was found to be hyperplastic.brCN 4/23/2019 IH. Pathology revealed lymphocytic colitis in the right and left colon.brEGD 8/27/2019 as below bx positive for nondysplastic short segment Duran's esophagusbrEGD 9/20/2022 as below bx reflux esophagitis and (+) BE

## 2024-05-11 DIAGNOSIS — E78.00 HYPERCHOLESTEREMIA: Chronic | ICD-10-CM

## 2024-05-13 RX ORDER — ATORVASTATIN CALCIUM 40 MG/1
40 TABLET, FILM COATED ORAL DAILY
Qty: 90 TABLET | Refills: 3 | Status: SHIPPED | OUTPATIENT
Start: 2024-05-13

## 2024-06-07 ENCOUNTER — OFFICE VISIT (OUTPATIENT)
Dept: OBSTETRICS AND GYNECOLOGY | Age: 69
End: 2024-06-07
Payer: MEDICARE

## 2024-06-07 ENCOUNTER — HOSPITAL ENCOUNTER (OUTPATIENT)
Facility: HOSPITAL | Age: 69
Discharge: HOME | End: 2024-06-07
Admitting: OBSTETRICS & GYNECOLOGY
Payer: MEDICARE

## 2024-06-07 VITALS
DIASTOLIC BLOOD PRESSURE: 80 MMHG | HEIGHT: 63 IN | BODY MASS INDEX: 24.45 KG/M2 | WEIGHT: 138 LBS | SYSTOLIC BLOOD PRESSURE: 124 MMHG

## 2024-06-07 DIAGNOSIS — Z12.31 VISIT FOR SCREENING MAMMOGRAM: ICD-10-CM

## 2024-06-07 DIAGNOSIS — Z12.31 ENCOUNTER FOR SCREENING MAMMOGRAM FOR MALIGNANT NEOPLASM OF BREAST: ICD-10-CM

## 2024-06-07 DIAGNOSIS — Z01.419 ENCOUNTER FOR GYNECOLOGICAL EXAMINATION: Primary | ICD-10-CM

## 2024-06-07 DIAGNOSIS — Z12.31 SCREENING MAMMOGRAM FOR BREAST CANCER: Primary | ICD-10-CM

## 2024-06-07 DIAGNOSIS — Z11.51 ENCOUNTER FOR SCREENING FOR HUMAN PAPILLOMAVIRUS (HPV): ICD-10-CM

## 2024-06-07 DIAGNOSIS — A60.04 HERPES, VULVOVAGINITIS: ICD-10-CM

## 2024-06-07 PROCEDURE — 77067 SCR MAMMO BI INCL CAD: CPT

## 2024-06-07 PROCEDURE — 77063 BREAST TOMOSYNTHESIS BI: CPT

## 2024-06-07 RX ORDER — VALACYCLOVIR HYDROCHLORIDE 500 MG/1
500 TABLET, FILM COATED ORAL 2 TIMES DAILY
COMMUNITY
End: 2024-06-07 | Stop reason: SDUPTHER

## 2024-06-07 RX ORDER — VALACYCLOVIR HYDROCHLORIDE 500 MG/1
500 TABLET, FILM COATED ORAL 2 TIMES DAILY PRN
Qty: 6 TABLET | Refills: 4 | Status: SHIPPED | OUTPATIENT
Start: 2024-06-07 | End: 2024-06-10

## 2024-06-07 NOTE — PROGRESS NOTES
".Subjective     Chief Complaint   Patient presents with    Annual Exam     Annual exam last pap 2021 neg/neg, m/g done today, dexa 2023, colonoscopy 2024       History of Present Illness    Cara Maharaj is a 69 y.o.  who presents for annual exam.  She has occ HSV outbreaks and requests refills on valtrex    Obstetric History:  OB History          2    Para   2    Term   2            AB        Living             SAB        IAB        Ectopic        Molar        Multiple        Live Births                   Menstrual History:     No LMP recorded (lmp unknown). Patient is postmenopausal.         Current contraception: post menopausal status  History of abnormal Pap smear: remote hx with neg f/u, no HSIL  Received Gardasil immunization: no  Perform regular self breast exam:  yes  Family history of uterine or ovarian cancer: no  Family History of colon cancer: no  Family history of breast cancer: yes - sister --heather salazar 3.8 %    Mammogram: scheduled today  Colonoscopy: up to date, done 2024--she notes they advised 5 yr f/u  DEXA: up to date, done     Exercise: moderately active  Calcium/Vitamin D: adequate intake    The following portions of the patient's history were reviewed and updated as appropriate: allergies, current medications, past family history, past medical history, past social history, past surgical history, and problem list.    Review of Systems    Review of Systems   Constitutional: Negative for fatigue.   Respiratory: Negative for shortness of breath.    Gastrointestinal: Negative for abdominal pain.   Genitourinary: Positive for occ HSV outbreaks; Negative for vag bleeding  Neurological: Negative for severe headaches.   Psychiatric/Behavioral: Negative for dysphoric mood.         Objective   Physical Exam    /80   Ht 160 cm (63\")   Wt 62.6 kg (138 lb)   LMP  (LMP Unknown)   BMI 24.45 kg/m²   General:   Alert, in no distress   Heart: regular " rate and rhythm   Lungs: clear to auscultation bilaterally   Breast: Inspection is negative. Left breast is without masses, retractions, nipple discharge or axillary adenopathy. Right breast is without masses, retractions, nipple discharge or axillary adenopathy.     Neck: supple   Abdomen: Soft, no tenderness or guarding   Pelvis: External genitalia: healing HSV ulcer right lateral vulva; otherwise normal general appearance  Urinary system: urethral meatus normal  Vaginal: atrophic mucosa without prolapse or lesions  Cervix: normal appearance  Adnexa: no masses or tenderness  Uterus: normal, nontender   Extremities: Normal without edema   Neurologic: Alert and oriented   Psychiatric: Normal affect, judgment and mood     Assessment & Plan   Diagnoses and all orders for this visit:    1. Encounter for gynecological examination (Primary)  -     IGP, Apt HPV,rfx 16 / 18,45    2. Encounter for screening mammogram for malignant neoplasm of breast    3. Encounter for screening for human papillomavirus (HPV)  -     IGP, Apt HPV,rfx 16 / 18,45    4. Herpes, vulvovaginitis    Other orders  -     valACYclovir (VALTREX) 500 MG tablet; Take 1 tablet by mouth 2 (Two) Times a Day As Needed (outbreak) for up to 3 days.  Dispense: 6 tablet; Refill: 4        All questions answered.  Breast self exam technique reviewed and patient encouraged to perform self-exam monthly.  Discussed healthy lifestyle modifications.  Recommended 30 minutes of aerobic exercise five times per week.  Discussed calcium / Vit D needs to prevent osteoporosis.  Advised pt to call if she does not receive results of pap and mammogram within 2 weeks    Pt plans to f/u with Dr. Alford for treatment of bone density

## 2024-06-10 RX ORDER — SCOLOPAMINE TRANSDERMAL SYSTEM 1 MG/1
1 PATCH, EXTENDED RELEASE TRANSDERMAL
Qty: 4 EACH | Refills: 0 | Status: SHIPPED | OUTPATIENT
Start: 2024-06-10

## 2024-06-10 NOTE — TELEPHONE ENCOUNTER
LEAVING FOR A CRUISE IN ABOUT A WEEK       Caller: Cara Maharaj    Relationship: Self    Best call back number:     [phone] (Mobile)       Requested Prescriptions:   Requested Prescriptions     Pending Prescriptions Disp Refills    Scopolamine 1 MG/3DAYS patch 4 each 0     Sig: Place 1 patch on the skin as directed by provider Every 72 (Seventy-Two) Hours.        Pharmacy where request should be sent: [id number] [address] AT [address] - [phone]  - [phone] FX     Last office visit with prescribing clinician: 1/29/2024   Last telemedicine visit with prescribing clinician: Visit date not found   Next office visit with prescribing clinician: 1/30/2025     Additional details provided by patient:     Does the patient have less than a 3 day supply:  [] Yes  [] No    Would you like a call back once the refill request has been completed: [] Yes [] No    If the office needs to give you a call back, can they leave a voicemail: [] Yes [] No    Jenn Cornell   06/10/24 09:19 EDT

## 2024-06-11 LAB
CYTOLOGIST CVX/VAG CYTO: NORMAL
CYTOLOGY CVX/VAG DOC CYTO: NORMAL
CYTOLOGY CVX/VAG DOC THIN PREP: NORMAL
DX ICD CODE: NORMAL
HPV I/H RISK 4 DNA CVX QL PROBE+SIG AMP: NEGATIVE
Lab: NORMAL
OTHER STN SPEC: NORMAL
STAT OF ADQ CVX/VAG CYTO-IMP: NORMAL

## 2024-07-02 ENCOUNTER — OFFICE (AMBULATORY)
Dept: URBAN - METROPOLITAN AREA CLINIC 76 | Facility: CLINIC | Age: 69
End: 2024-07-02

## 2024-07-02 VITALS
HEART RATE: 72 BPM | WEIGHT: 137 LBS | HEIGHT: 63 IN | DIASTOLIC BLOOD PRESSURE: 84 MMHG | SYSTOLIC BLOOD PRESSURE: 125 MMHG

## 2024-07-02 DIAGNOSIS — K22.70 BARRETT'S ESOPHAGUS WITHOUT DYSPLASIA: ICD-10-CM

## 2024-07-02 DIAGNOSIS — K52.9 NONINFECTIVE GASTROENTERITIS AND COLITIS, UNSPECIFIED: ICD-10-CM

## 2024-07-02 DIAGNOSIS — R19.7 DIARRHEA, UNSPECIFIED: ICD-10-CM

## 2024-07-02 PROCEDURE — 99214 OFFICE O/P EST MOD 30 MIN: CPT

## 2025-01-03 ENCOUNTER — OFFICE (AMBULATORY)
Dept: URBAN - METROPOLITAN AREA CLINIC 76 | Facility: CLINIC | Age: 70
End: 2025-01-03
Payer: MEDICARE

## 2025-01-03 VITALS
SYSTOLIC BLOOD PRESSURE: 126 MMHG | OXYGEN SATURATION: 95 % | HEART RATE: 64 BPM | DIASTOLIC BLOOD PRESSURE: 73 MMHG | HEIGHT: 63 IN | WEIGHT: 139 LBS

## 2025-01-03 DIAGNOSIS — K21.9 GASTRO-ESOPHAGEAL REFLUX DISEASE WITHOUT ESOPHAGITIS: ICD-10-CM

## 2025-01-03 DIAGNOSIS — K52.9 NONINFECTIVE GASTROENTERITIS AND COLITIS, UNSPECIFIED: ICD-10-CM

## 2025-01-03 DIAGNOSIS — K22.70 BARRETT'S ESOPHAGUS WITHOUT DYSPLASIA: ICD-10-CM

## 2025-01-03 PROCEDURE — 99214 OFFICE O/P EST MOD 30 MIN: CPT

## 2025-01-03 RX ORDER — LOPERAMIDE HYDROCHLORIDE 2 MG/1
CAPSULE ORAL
Qty: 60 | Refills: 2 | Status: ACTIVE
Start: 2025-01-03

## 2025-01-30 ENCOUNTER — OFFICE VISIT (OUTPATIENT)
Dept: INTERNAL MEDICINE | Facility: CLINIC | Age: 70
End: 2025-01-30
Payer: MEDICARE

## 2025-01-30 VITALS
SYSTOLIC BLOOD PRESSURE: 124 MMHG | WEIGHT: 138.6 LBS | DIASTOLIC BLOOD PRESSURE: 82 MMHG | HEIGHT: 63 IN | BODY MASS INDEX: 24.56 KG/M2

## 2025-01-30 DIAGNOSIS — J31.0 RHINITIS, UNSPECIFIED TYPE: ICD-10-CM

## 2025-01-30 DIAGNOSIS — E78.00 HYPERCHOLESTEREMIA: Chronic | ICD-10-CM

## 2025-01-30 DIAGNOSIS — Z00.00 ENCOUNTER FOR SUBSEQUENT ANNUAL WELLNESS VISIT (AWV) IN MEDICARE PATIENT: Primary | ICD-10-CM

## 2025-01-30 DIAGNOSIS — K22.70 BARRETT'S ESOPHAGUS WITHOUT DYSPLASIA: ICD-10-CM

## 2025-01-30 DIAGNOSIS — D36.9 TUBULAR ADENOMA: ICD-10-CM

## 2025-01-30 PROCEDURE — 1160F RVW MEDS BY RX/DR IN RCRD: CPT | Performed by: INTERNAL MEDICINE

## 2025-01-30 PROCEDURE — 1126F AMNT PAIN NOTED NONE PRSNT: CPT | Performed by: INTERNAL MEDICINE

## 2025-01-30 PROCEDURE — 1170F FXNL STATUS ASSESSED: CPT | Performed by: INTERNAL MEDICINE

## 2025-01-30 PROCEDURE — G0439 PPPS, SUBSEQ VISIT: HCPCS | Performed by: INTERNAL MEDICINE

## 2025-01-30 PROCEDURE — G2211 COMPLEX E/M VISIT ADD ON: HCPCS | Performed by: INTERNAL MEDICINE

## 2025-01-30 PROCEDURE — 1159F MED LIST DOCD IN RCRD: CPT | Performed by: INTERNAL MEDICINE

## 2025-01-30 PROCEDURE — 99213 OFFICE O/P EST LOW 20 MIN: CPT | Performed by: INTERNAL MEDICINE

## 2025-01-30 RX ORDER — FLUTICASONE PROPIONATE 50 MCG
2 SPRAY, SUSPENSION (ML) NASAL DAILY
Qty: 16 G | Refills: 1 | Status: SHIPPED | OUTPATIENT
Start: 2025-01-30

## 2025-01-30 RX ORDER — CYCLOSPORINE OPHTHALMIC SOLUTION 1 MG/ML
SOLUTION/ DROPS OPHTHALMIC 2 TIMES DAILY
COMMUNITY

## 2025-01-30 NOTE — PROGRESS NOTES
Subjective   The ABCs of the Annual Wellness Visit  Medicare Wellness Visit      Cara Maharaj is a 69 y.o. patient who presents for a Medicare Wellness Visit.    The following portions of the patient's history were reviewed and   updated as appropriate: allergies, current medications, past family history, past medical history, past social history, past surgical history, and problem list.    Compared to one year ago, the patient's physical   health is the same.  Compared to one year ago, the patient's mental   health is the same.    Recent Hospitalizations:  She was not admitted to the hospital during the last year.     Current Medical Providers:  Patient Care Team:  Francois Mock MD as PCP - General (Internal Medicine)    Outpatient Medications Prior to Visit   Medication Sig Dispense Refill    atorvastatin (LIPITOR) 40 MG tablet TAKE 1 TABLET BY MOUTH DAILY 90 tablet 3    Budesonide (ENTOCORT EC) 3 MG 24 hr capsule 1 tab po daily  3    Calcium Citrate-Vitamin D (CALCIUM CITRATE + PO) Take  by mouth.      cycloSPORINE (Vevye) 0.1 % solution Apply  to eye(s) as directed by provider 2 (Two) Times a Day.      fluorometholone (EFLONE) 0.1 % ophthalmic suspension Administer 1 drop to both eyes 4 (Four) Times a Day.      metroNIDAZOLE (METROCREAM) 0.75 % cream Apply  topically to the appropriate area as directed 2 (Two) Times a Day.      multivitamin (MULTI VITAMIN PO) Take  by mouth Daily.      omeprazole (priLOSEC) 40 MG capsule TAKE 1 CAPSULE BY MOUTH EVERY DAY BEFORE MEALS      zoledronic acid (RECLAST) 5 MG/100ML solution infusion Infuse 100 mL into a venous catheter 1 (One) Time.      Scopolamine 1 MG/3DAYS patch Place 1 patch on the skin as directed by provider Every 72 (Seventy-Two) Hours. 4 each 0     No facility-administered medications prior to visit.     No opioid medication identified on active medication list. I have reviewed chart for other potential  high risk medication/s and harmful drug  "interactions in the elderly.      Aspirin is not on active medication list.  Aspirin use is not indicated based on review of current medical condition/s. Risk of harm outweighs potential benefits.  .    Patient Active Problem List   Diagnosis    Hypercholesteremia    Gastroesophageal reflux disease    Jones's esophagus without dysplasia    Lymphocytic colitis    Herpes, vulvovaginitis    Tubular adenoma     Advance Care Planning Advance Directive is not on file.  ACP discussion was held with the patient during this visit. Patient has an advance directive (not in EMR), copy requested.            Objective   Vitals:    25 1041   BP: 124/82   Weight: 62.9 kg (138 lb 9.6 oz)   Height: 160 cm (62.99\")       Estimated body mass index is 24.56 kg/m² as calculated from the following:    Height as of this encounter: 160 cm (62.99\").    Weight as of this encounter: 62.9 kg (138 lb 9.6 oz).    BMI is within normal parameters. No other follow-up for BMI required.           Does the patient have evidence of cognitive impairment? No  Lab Results   Component Value Date    CHLPL 201 (H) 2025    TRIG 137 2025    HDL 87 (H) 2025    LDL 91 2025    VLDL 23 2025                                                                                                Health  Risk Assessment    Smoking Status:  Social History     Tobacco Use   Smoking Status Former    Current packs/day: 0.00    Average packs/day: 1.5 packs/day for 14.0 years (21.0 ttl pk-yrs)    Types: Cigarettes    Start date:     Quit date: 1980    Years since quittin.1    Passive exposure: Past   Smokeless Tobacco Never     Alcohol Consumption:  Social History     Substance and Sexual Activity   Alcohol Use No    Comment: she has been sober 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 one glass of wine 4 times per week       Fall Risk Screen  STEADI Fall Risk Assessment was completed, and patient is at LOW risk for falls.Assessment completed " on:2025    Depression Screening   Little interest or pleasure in doing things? Not at all   Feeling down, depressed, or hopeless? Not at all   PHQ-2 Total Score 0      Health Habits and Functional and Cognitive Screenin/30/2025    10:47 AM   Functional & Cognitive Status   Do you have difficulty preparing food and eating? No   Do you have difficulty bathing yourself, getting dressed or grooming yourself? No   Do you have difficulty using the toilet? No   Do you have difficulty moving around from place to place? No   Do you have trouble with steps or getting out of a bed or a chair? No   Current Diet Well Balanced Diet   Dental Exam Up to date   Eye Exam Up to date   Exercise (times per week) 6 times per week   Current Exercises Include Walking;House Cleaning   Do you need help using the phone?  No   Are you deaf or do you have serious difficulty hearing?  No   Do you need help to go to places out of walking distance? No   Do you need help shopping? No   Do you need help preparing meals?  No   Do you need help with housework?  No   Do you need help with laundry? No   Do you need help taking your medications? No   Do you need help managing money? No   Do you ever drive or ride in a car without wearing a seat belt? No   Have you felt unusual stress, anger or loneliness in the last month? No   Who do you live with? Spouse   If you need help, do you have trouble finding someone available to you? No   Have you been bothered in the last four weeks by sexual problems? No   Do you have difficulty concentrating, remembering or making decisions? No           Age-appropriate Screening Schedule:  Refer to the list below for future screening recommendations based on patient's age, sex and/or medical conditions. Orders for these recommended tests are listed in the plan section. The patient has been provided with a written plan.    Health Maintenance List  Health Maintenance   Topic Date Due    TDAP/TD VACCINES (2 -  "Td or Tdap) 09/03/2024    DXA SCAN  03/17/2025    LIPID PANEL  01/23/2026    ANNUAL WELLNESS VISIT  01/30/2026    MAMMOGRAM  06/07/2026    COLORECTAL CANCER SCREENING  04/25/2029    HEPATITIS C SCREENING  Completed    COVID-19 Vaccine  Completed    INFLUENZA VACCINE  Completed    Pneumococcal Vaccine 65+  Completed    ZOSTER VACCINE  Completed                                                                                                                                                CMS Preventative Services Quick Reference  Risk Factors Identified During Encounter  Immunizations Discussed/Encouraged: Tdap    The above risks/problems have been discussed with the patient.  Pertinent information has been shared with the patient in the After Visit Summary.  An After Visit Summary and PPPS were made available to the patient.    Follow Up:   Next Medicare Wellness visit to be scheduled in 1 year.         Additional E&M Note during same encounter follows:  Patient has additional, significant, and separately identifiable condition(s)/problem(s) that require work above and beyond the Medicare Wellness Visit     Chief Complaint  Medicare Wellness-subsequent    Subjective   HPIAWV and hyperlipidemia  Cara is also being seen today for an annual adult preventative physical exam.   She has been having more diarrhea. She saw GI recently and had imodium added. Her diarrhea is usually in the am. She had a cscope last April with an adenoma. She has a runny nose. She take zyrtec nightly and it does not help. Her symptoms get better when she is out of town.               Objective   Vital Signs:  /82   Ht 160 cm (62.99\")   Wt 62.9 kg (138 lb 9.6 oz)   BMI 24.56 kg/m²   Physical Exam  Vitals reviewed.   Constitutional:       Appearance: She is well-developed.   HENT:      Head: Normocephalic and atraumatic.      Mouth/Throat:      Mouth: Mucous membranes are moist.      Pharynx: Oropharynx is clear.   Neck:      Vascular: " No carotid bruit.   Cardiovascular:      Rate and Rhythm: Normal rate and regular rhythm.      Heart sounds: Normal heart sounds, S1 normal and S2 normal.   Pulmonary:      Effort: Pulmonary effort is normal.      Breath sounds: Normal breath sounds.   Abdominal:      Palpations: Abdomen is soft. There is no hepatomegaly or splenomegaly.   Skin:     General: Skin is warm.   Neurological:      Mental Status: She is alert.   Psychiatric:         Behavior: Behavior normal.                 Assessment and Plan               Encounter for subsequent annual wellness visit (AWV) in Medicare patient    Tubular adenoma    Jones's esophagus without dysplasia    Hypercholesteremia     Rhinitis, unspecified type    Diagnoses and all orders for this visit:    1. Encounter for subsequent annual wellness visit (AWV) in Medicare patient (Primary)    2. Tubular adenoma    3. Jones's esophagus without dysplasia    4. Hypercholesteremia  -     Comprehensive Metabolic Panel; Future  -     Lipid Panel With / Chol / HDL Ratio; Future    5. Rhinitis, unspecified type  -     fluticasone (FLONASE) 50 MCG/ACT nasal spray; Administer 2 sprays into the nostril(s) as directed by provider Daily.  Dispense: 16 g; Refill: 1       New Medications Ordered This Visit   Medications    fluticasone (FLONASE) 50 MCG/ACT nasal spray     Sig: Administer 2 sprays into the nostril(s) as directed by provider Daily.     Dispense:  16 g     Refill:  1          Follow Up   Return in about 1 year (around 1/30/2026) for Lab Before [facility], Medicare Wellness.  Patient was given instructions and counseling regarding her condition or for health maintenance advice. Please see specific information pulled into the AVS if appropriate.    Discussed avoiding etoh with h/o alcoholism. She will be getting a DEXA in March. Reviewed labs. [facility] Tdap. Trial flonase for runny nose. She will get EGD this fall and cscope in 2029.

## 2025-04-04 ENCOUNTER — OFFICE (AMBULATORY)
Dept: URBAN - METROPOLITAN AREA CLINIC 76 | Facility: CLINIC | Age: 70
End: 2025-04-04
Payer: MEDICARE

## 2025-04-04 VITALS
SYSTOLIC BLOOD PRESSURE: 120 MMHG | HEIGHT: 63 IN | HEART RATE: 71 BPM | OXYGEN SATURATION: 95 % | WEIGHT: 137 LBS | DIASTOLIC BLOOD PRESSURE: 74 MMHG

## 2025-04-04 DIAGNOSIS — K52.9 NONINFECTIVE GASTROENTERITIS AND COLITIS, UNSPECIFIED: ICD-10-CM

## 2025-04-04 DIAGNOSIS — K22.70 BARRETT'S ESOPHAGUS WITHOUT DYSPLASIA: ICD-10-CM

## 2025-04-04 PROCEDURE — 99213 OFFICE O/P EST LOW 20 MIN: CPT

## 2025-04-08 PROBLEM — K22.70 BARRETT'S ESOPHAGUS WITHOUT DYSPLASIA: Status: ACTIVE | Noted: 2022-09-20

## 2025-05-27 ENCOUNTER — OFFICE VISIT (OUTPATIENT)
Dept: INTERNAL MEDICINE | Facility: CLINIC | Age: 70
End: 2025-05-27
Payer: MEDICARE

## 2025-05-27 VITALS — WEIGHT: 135 LBS | OXYGEN SATURATION: 98 % | BODY MASS INDEX: 23.92 KG/M2 | HEIGHT: 63 IN | TEMPERATURE: 98.5 F

## 2025-05-27 DIAGNOSIS — E78.00 HYPERCHOLESTEREMIA: Chronic | ICD-10-CM

## 2025-05-27 DIAGNOSIS — J02.9 SORE THROAT: Primary | ICD-10-CM

## 2025-05-27 PROCEDURE — 1160F RVW MEDS BY RX/DR IN RCRD: CPT | Performed by: INTERNAL MEDICINE

## 2025-05-27 PROCEDURE — G2211 COMPLEX E/M VISIT ADD ON: HCPCS | Performed by: INTERNAL MEDICINE

## 2025-05-27 PROCEDURE — 1126F AMNT PAIN NOTED NONE PRSNT: CPT | Performed by: INTERNAL MEDICINE

## 2025-05-27 PROCEDURE — 99213 OFFICE O/P EST LOW 20 MIN: CPT | Performed by: INTERNAL MEDICINE

## 2025-05-27 PROCEDURE — 1159F MED LIST DOCD IN RCRD: CPT | Performed by: INTERNAL MEDICINE

## 2025-05-27 NOTE — PROGRESS NOTES
Subjective        Chief Complaint   Patient presents with    Sore Throat     Painful when swallow for several months            Cara Maharaj is a 69 y.o. female who presents for    Patient Active Problem List   Diagnosis    Hypercholesteremia    Gastroesophageal reflux disease    Jones's esophagus without dysplasia    Lymphocytic colitis    Herpes, vulvovaginitis    Tubular adenoma       History of Present Illness       She has had a sore throat for 6 weeks. She went to the [facility] and rapid strep was negative. The front of her neck is sore to touch on Saturday. She denies fever, chills or reflux. The muscles hurt when she swallows. It is worse with the action of swallowing. She has not had any unintentional wt loss or tremor.  No Known Allergies    Current Outpatient Medications on File Prior to Visit   Medication Sig Dispense Refill    atorvastatin (LIPITOR) 40 MG tablet TAKE 1 TABLET BY MOUTH DAILY 90 tablet 3    Budesonide (ENTOCORT EC) 3 MG 24 hr capsule 1 tab po daily  3    Calcium Citrate-Vitamin D (CALCIUM CITRATE + PO) Take  by mouth.      cycloSPORINE (Vevye) 0.1 % solution Apply  to eye(s) as directed by provider 2 (Two) Times a Day.      fluorometholone (EFLONE) 0.1 % ophthalmic suspension Administer 1 drop to both eyes 4 (Four) Times a Day.      loperamide (IMODIUM) 2 MG capsule TAKE 1 TO 2 CAPSULES BY MOUTH EVERY NIGHT AT BEDTIME FOR 30 DAYS      metroNIDAZOLE (METROCREAM) 0.75 % cream Apply  topically to the appropriate area as directed 2 (Two) Times a Day.      multivitamin (MULTI VITAMIN PO) Take  by mouth Daily.      omeprazole (priLOSEC) 40 MG capsule TAKE 1 CAPSULE BY MOUTH EVERY DAY BEFORE MEALS      valACYclovir (VALTREX) 500 MG tablet TAKE 1 TABLET BY MOUTH TWICE DAILY FOR UP TO 3 DAYS AS NEEDED FOR OUTBREAK      zoledronic acid (RECLAST) 5 MG/100ML solution infusion Infuse 100 mL into a venous catheter 1 (One) Time.       No current facility-administered medications on file prior to visit.        Past Medical History:   Diagnosis Date    Alcohol abuse     Anxiety     COVID-19 10/2021    Depression     Genital herpes 2021    Osteopenia     Rosacea     SCC (squamous cell carcinoma), arm, right     Ulcerative colitis        Past Surgical History:   Procedure Laterality Date    CATARACT EXTRACTION, BILATERAL      COLONOSCOPY  2019    COLONOSCOPY  2024    repeat 5 years with Dr. Johnson    ENDOSCOPY  2022    SHOULDER SURGERY      TUBAL ABDOMINAL LIGATION         Family History   Problem Relation Age of Onset    Lung cancer Mother     Stroke Father     Lung cancer Father     Depression Father     Melanoma Father     Breast cancer Sister     Asthma Sister     Ulcerative colitis Sister     No Known Problems Brother     No Known Problems Daughter     No Known Problems Son     No Known Problems Maternal Grandmother     No Known Problems Paternal Grandmother     No Known Problems Maternal Aunt     No Known Problems Paternal Aunt     BRCA 1/2 Neg Hx     Colon cancer Neg Hx     Endometrial cancer Neg Hx     Ovarian cancer Neg Hx     Uterine cancer Neg Hx        Social History     Socioeconomic History    Marital status:    Tobacco Use    Smoking status: Former     Current packs/day: 0.00     Average packs/day: 1.5 packs/day for 14.0 years (21.0 ttl pk-yrs)     Types: Cigarettes     Start date:      Quit date:      Years since quittin.4     Passive exposure: Past    Smokeless tobacco: Never   Vaping Use    Vaping status: Never Used   Substance and Sexual Activity    Alcohol use: Yes     Comment: she has been sober 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 one glass of wine 4 times per week    Drug use: No    Sexual activity: Yes     Partners: Male     Birth control/protection: None           The following portions of the patient's history were reviewed and updated as appropriate: problem list, allergies, current medications, past medical history, past family history, past social history, and past  "surgical history.    Review of Systems    Immunization History   Administered Date(s) Administered    COVID-19 (PFIZER) 12YRS+ (COMIRNATY) 11/06/2023, 01/09/2025    COVID-19 (PFIZER) BIVALENT 12+YRS 11/02/2022    COVID-19 (PFIZER) Purple Cap Monovalent 03/01/2021, 03/22/2021, 11/27/2021    Covid-19 (Pfizer) Gray Cap Monovalent 04/11/2022    Fluad Quad 65+ 09/13/2019, 10/19/2020, 11/27/2021, 11/06/2023    Fluzone High-Dose 65+YRS 09/13/2019, 01/09/2025    Fluzone High-Dose 65+yrs 11/02/2022    Hepatitis A 05/22/2018, 01/22/2019    Pneumococcal Conjugate 13-Valent (PCV13) 10/19/2020    Pneumococcal Polysaccharide (PPSV23) 01/20/2022    Shingrix 10/22/2020, 12/22/2020    Tdap 09/03/2014    Zostavax 01/01/2011       Objective   Vitals:    05/27/25 1447   Temp: 98.5 °F (36.9 °C)   SpO2: 98%   Weight: 61.2 kg (135 lb)   Height: 160 cm (62.99\")     Body mass index is 23.92 kg/m².  Physical Exam  HENT:      Mouth/Throat:      Mouth: Mucous membranes are moist.      Pharynx: Oropharynx is clear.   Neck:      Thyroid: No thyroid mass or thyromegaly.   Lymphadenopathy:      Cervical: No cervical adenopathy.   Neurological:      Mental Status: She is alert.         Procedures    Assessment & Plan   Diagnoses and all orders for this visit:    1. Sore throat (Primary)  -     T4, Free  -     TSH    2. Hypercholesteremia  -     T4, Free  -     TSH      Rapid strep negative this weekend. She is pointing to her thyroid. I will start with TSH and FT4. May need US.             Return for Lab Today.  "

## 2025-05-28 DIAGNOSIS — J02.9 SORE THROAT: Primary | ICD-10-CM

## 2025-05-28 LAB
T4 FREE SERPL-MCNC: 1.17 NG/DL (ref 0.82–1.77)
TSH SERPL DL<=0.005 MIU/L-ACNC: 1.56 UIU/ML (ref 0.45–4.5)

## 2025-06-05 DIAGNOSIS — E78.00 HYPERCHOLESTEREMIA: Chronic | ICD-10-CM

## 2025-06-05 RX ORDER — ATORVASTATIN CALCIUM 40 MG/1
40 TABLET, FILM COATED ORAL DAILY
Qty: 90 TABLET | Refills: 3 | Status: SHIPPED | OUTPATIENT
Start: 2025-06-05

## 2025-06-10 ENCOUNTER — OFFICE VISIT (OUTPATIENT)
Dept: OBSTETRICS AND GYNECOLOGY | Age: 70
End: 2025-06-10
Payer: MEDICARE

## 2025-06-10 ENCOUNTER — HOSPITAL ENCOUNTER (OUTPATIENT)
Facility: HOSPITAL | Age: 70
Discharge: HOME | End: 2025-06-10
Payer: MEDICARE

## 2025-06-10 VITALS
BODY MASS INDEX: 23.85 KG/M2 | HEIGHT: 63 IN | WEIGHT: 134.6 LBS | SYSTOLIC BLOOD PRESSURE: 116 MMHG | DIASTOLIC BLOOD PRESSURE: 84 MMHG

## 2025-06-10 DIAGNOSIS — Z12.31 SCREENING MAMMOGRAM FOR BREAST CANCER: ICD-10-CM

## 2025-06-10 DIAGNOSIS — Z01.419 ENCOUNTER FOR GYNECOLOGICAL EXAMINATION: Primary | ICD-10-CM

## 2025-06-10 DIAGNOSIS — A60.04 HERPES, VULVOVAGINITIS: ICD-10-CM

## 2025-06-10 DIAGNOSIS — N64.59 INVERSION OF RIGHT NIPPLE: ICD-10-CM

## 2025-06-10 DIAGNOSIS — M85.88 OTHER SPECIFIED DISORDERS OF BONE DENSITY AND STRUCTURE, OTHER SITE: ICD-10-CM

## 2025-06-10 RX ORDER — VALACYCLOVIR HYDROCHLORIDE 500 MG/1
500 TABLET, FILM COATED ORAL 2 TIMES DAILY PRN
Qty: 6 TABLET | Refills: 3 | Status: SHIPPED | OUTPATIENT
Start: 2025-06-10 | End: 2025-06-13

## 2025-06-10 NOTE — PROGRESS NOTES
Subjective     Chief Complaint   Patient presents with    Gynecologic Exam     Annual Exam. Patient has concerns about inverted nipple on right breast, could not get mammogram and was advised to be examined.    Last Pap Smear - 2024 Neg/HPV Neg  Mammogram - 2024  Colonoscopy - 2024  Bone Scan - 3/17/2023       History of Present Illness    Cara Maharaj is a 70 y.o.  who presents for annual exam.  She denies vaginal bleeding/pelvic pain. She has occ HSV outbreaks.     She also notes she has recent inversion of right nipple with some change in color of areola. She denies breast lump, pain or nipple discharge. She has noted this for about 2 months.     She started Reclast through Dr. Alford and needs f/u bone density this summer.     Obstetric History:  OB History          2    Para   2    Term   2            AB        Living   2         SAB        IAB        Ectopic        Molar        Multiple        Live Births   2               Menstrual History:     No LMP recorded (lmp unknown). Patient is postmenopausal.         Current contraception: post menopausal status  History of abnormal Pap smear: yes - had colpo then neg f/u  Received Gardasil immunization: no  Perform regular self breast exam: yes  Family history of uterine or ovarian cancer: no  Family History of colon cancer: no  Family history of breast cancer: yes - sister, after menopause    Mammogram: ordered.  Colonoscopy: done 2024  DEXA: ordered.    Exercise: moderately active  Calcium/Vitamin D: adequate intake    The following portions of the patient's history were reviewed and updated as appropriate: allergies, current medications, past family history, past medical history, past social history, past surgical history, and problem list.    Review of Systems    Review of Systems fevers   Respiratory: Negative for shortness of breath.    Gastrointestinal: Negative for abdominal pain.   Genitourinary: Positive for occ HSV  "outbreaksNegative for vag bleeding  Neurological: Negative for headaches.   Psychiatric/Behavioral: Negative for dysphoric mood.   Breast: Positive for inversion of right nipple for 2 months, neg for lump, nipple discharge or pain        Objective   Physical Exam    /84 (BP Location: Right arm, Patient Position: Sitting, Cuff Size: Adult)   Ht 160 cm (63\")   Wt 61.1 kg (134 lb 9.6 oz)   LMP  (LMP Unknown)   BMI 23.84 kg/m²   General:   Alert, in no distress   Heart: regular rate and rhythm   Lungs: clear to auscultation bilaterally   Breast: Examined in sitted and supine positions. With inspection, right nipple noted to be inverted. Right breast without masses, retractions or axillary adenopathy. Able to gently paz nipple and some crusting noted over inverted tissue; nipple inverts spontaneously following exam. No nipple discharge noted with expression. Left breast is without masses, retractions, nipple discharge or axillary adenopathy.    Neck: supple   Abdomen: Soft, no tenderness or guarding   Pelvis: External genitalia: normal general appearance  Urinary system: urethral meatus normal  Vaginal: atrophic mucosa  Cervix: no masses or tenderness  Adnexa: no masses or tenderness  Uterus: normal, nontender   Extremities: Normal without edema   Neurologic: Alert and oriented   Psychiatric: Normal affect, judgment and mood     Assessment & Plan   Diagnoses and all orders for this visit:    1. Encounter for gynecological examination (Primary)    2. Inversion of right nipple  -     Mammo Diagnostic Digital Tomosynthesis Bilateral With CAD; Future  -     US Breast Right Limited; Future  -     Ambulatory Referral to Breast Surgery    3. Herpes, vulvovaginitis  -     DEXA Bone Density Axial; Future    4. Other specified disorders of bone density and structure, other site  -     DEXA Bone Density Axial; Future    Other orders  -     valACYclovir (VALTREX) 500 MG tablet; Take 1 tablet by mouth 2 (Two) Times a Day " As Needed (outbreak) for up to 3 days.  Dispense: 6 tablet; Refill: 3        All questions answered.  Breast self exam technique reviewed and patient encouraged to perform self-exam monthly.  Discussed healthy lifestyle modifications.  Recommended 30 minutes of aerobic exercise five times per week.  Discussed calcium/ Vit D needs to prevent osteoporosis.    Ordered dx imaging of breasts with breast surgery consult. Pt agrees to schedule. Plan f/u here 8 wks to review imaging/recommendations.

## 2025-06-11 ENCOUNTER — HOSPITAL ENCOUNTER (OUTPATIENT)
Dept: ULTRASOUND IMAGING | Facility: HOSPITAL | Age: 70
Discharge: HOME | End: 2025-06-11
Admitting: INTERNAL MEDICINE
Payer: MEDICARE

## 2025-06-11 DIAGNOSIS — J02.9 SORE THROAT: ICD-10-CM

## 2025-06-11 PROCEDURE — 76536 US EXAM OF HEAD AND NECK: CPT

## 2025-06-23 ENCOUNTER — APPOINTMENT (OUTPATIENT)
Dept: WOMENS IMAGING | Facility: HOSPITAL | Age: 70
End: 2025-06-23
Payer: MEDICARE

## 2025-06-23 PROCEDURE — G0279 TOMOSYNTHESIS, MAMMO: HCPCS | Performed by: RADIOLOGY

## 2025-06-23 PROCEDURE — 77066 DX MAMMO INCL CAD BI: CPT | Performed by: RADIOLOGY

## 2025-06-23 PROCEDURE — 76642 ULTRASOUND BREAST LIMITED: CPT | Performed by: RADIOLOGY

## 2025-07-17 NOTE — PROGRESS NOTES
GENERAL SURGERY BENIGN BREAST HISTORY AND PHYSICAL     SUMMARY:  Cara Maharaj is a 70 y.o. lady with:    (1) Inverted right nipple and associated discharge:   - Patient reports new onset right nipple inversion with associated white/yellow nipple discharge since April 2025.  - Bilateral diagnostic mammogram and right breast ultrasound on 6/23/2025 BI-RADS 2.  Report noted right mild nipple retraction with associated mild duct ectasia thought to be compatible with a benign anatomic variation.  -TSH from 5/27/2025 within normal limits.  - Plan: Prolactin blood level, bilateral breast MRI    (2) High risk screening:   -Kaidenkerry Harjit lifetime breast cancer risk: 3.6%. This patient does not qualify for high risk screening.    Other:  -Patient reports that she has 2 nieces whom she thinks tested positive for the BRCA2 gene.  We discussed genetic testing, and she deferred at this time.  She would like to discuss this at a later visit.    Referring Provider: Carol Solano MD    Chief complaint: inverted right nipple & discharge    HPI: Ms. Cara Maharaj is seen at the request of Carol Solano MD. The patient is a 70 y.o. woman being seen for an inverted right nipple and right nipple discharge.    Patient reports right nipple inversion with associated white/yellow nipple discharge since April 2025.  She denies any bloody nipple discharge.  Her work-up is detailed in the breast history section below. She has had regular annual mammograms. She denies any prior history of abnormal mammograms or breast biopsies.  Other than the symptoms above, she denies any other breast lumps, pain, skin changes, or nipple discharge.    She has a family history of breast cancer in her sister (63). She denies any family history of ovarian cancer.  She reports that she has 2 nieces who she believes both tested positive for the BRCA2 gene.    TIMELINE OF WORKUP:  5/12/2023 bilateral screening mammogram:  Impression:  There is no  mammographic evidence of malignancy.  Screening mammogram in 1 year is recommended.  BI-RADS Category 1    6/7/2024 bilateral screening mammogram:  There are scattered areas of fibroglandular density.  No suspicious masses significant calcifications or other abnormalities are seen.  Impression:  There is no mammographic evidence of malignancy.  Screening mammogram in one year is recommended.  BI-RADS Category 1.    6/23/2025 bilateral diagnostic mammogram and right breast ultrasound:  There are scattered areas of fibroglandular density.  Finding 1: There are few stable round calcifications with scattered distribution seen in both breast.  No suspicious masses, suspicious microcalcifications or significant architectural distortion are identified.  There is been no significant change from the prior exams.  Finding 2: The patient indicates nipple inversion and occasional white-colored discharge in the right breast.  There is no suspicious finding in the region of clinical concern on mammography.  However there is mild flattening and retraction of the the right nipple which is slightly more prominent compared to prior exams.  Ultrasound:  Finding 2: There is mild subareolar duct ectasia without focally dilated duct or evidence of intraductal mass.  There is no evidence of skin thickening, retroareolar mass or abnormal cystic elements.  Impression:  Finding 1: Stable calcifications in both breast are benign negative.  Finding 2: Area in the right breast is benign negative.  Right mild nipple retraction with associated mild duct ectasia.  This is compatible with benign anatomic variation.  Patient's reported symptoms of white-colored nipple discharge may reflect mild chronic and laboratory change associated with duct tissue, or galactorrhea.  Clinical follow-up is recommended.  If symptoms persist or worsen, surgical consultation will be recommended.  Screening mammogram in 1 year is recommended.  Clinical follow-up is  recommended.  BI-RADS 2.    MEDICAL HISTORY:   Gynecologic History:   . P:2   Age at first childbirth: 16  Lactation/How long: Yes  Age at menarche: 11  Age at menopause: 48  Total years of oral contraceptive use: 2 years  Total years of hormone replacement therapy: N/A    Past Medical History:   Jones's esophagus  Anxiety and depression  Genital herpes  Osteopenia  Rosacea  Ulcerative colitis    Past Surgical History:    Past Surgical History:   Procedure Laterality Date    CATARACT EXTRACTION, BILATERAL      COLONOSCOPY  2019    COLONOSCOPY  2024    repeat 5 years with Dr. Johnson    ENDOSCOPY  2022    SHOULDER SURGERY      TUBAL ABDOMINAL LIGATION       Family History:    As above    Social History:   Denies tobacco use. Former smoker, quit 45 years ago  Occasional alcohol use     Allergies:   No Known Allergies    Medications:     Current Outpatient Medications:     atorvastatin (LIPITOR) 40 MG tablet, Take 1 tablet by mouth Daily., Disp: 90 tablet, Rfl: 3    Budesonide (ENTOCORT EC) 3 MG 24 hr capsule, 1 tab po daily, Disp: , Rfl: 3    Calcium Citrate-Vitamin D (CALCIUM CITRATE + PO), Take  by mouth., Disp: , Rfl:     cycloSPORINE (Vevye) 0.1 % solution, Apply  to eye(s) as directed by provider 2 (Two) Times a Day., Disp: , Rfl:     fluorometholone (EFLONE) 0.1 % ophthalmic suspension, Administer 1 drop to both eyes 4 (Four) Times a Day., Disp: , Rfl:     loperamide (IMODIUM) 2 MG capsule, TAKE 1 TO 2 CAPSULES BY MOUTH EVERY NIGHT AT BEDTIME FOR 30 DAYS, Disp: , Rfl:     metroNIDAZOLE (METROCREAM) 0.75 % cream, Apply  topically to the appropriate area as directed 2 (Two) Times a Day., Disp: , Rfl:     multivitamin (MULTI VITAMIN PO), Take  by mouth Daily., Disp: , Rfl:     omeprazole (priLOSEC) 40 MG capsule, TAKE 1 CAPSULE BY MOUTH EVERY DAY BEFORE MEALS, Disp: , Rfl:     zoledronic acid (RECLAST) 5 MG/100ML solution infusion, Infuse 100 mL into a venous catheter 1 (One) Time., Disp: , Rfl:      Labs:    Labs from 5/27/2025 personally reviewed by me.  TSH 1.56     ROS:   Constitutional: Negative for fevers or chills  HENT: Negative for hearing loss or runny nose  Eyes: Negative for vision changes or scleral icterus  Respiratory: Negative for cough or shortness of breath  Cardiovascular: Negative for chest pain or heart palpitations  Gastrointestinal: Negative for abdominal pain, nausea, vomiting, constipation, melena, or hematochezia  Genitourinary: Negative for hematuria or dysuria  Musculoskeletal: Negative for joint swelling or gait instability  Neurologic: Negative for tremors or seizures  All other systems reviewed and negative    PHYSICAL EXAM:   Vitals:    07/18/25 1145   BP: 128/70   Pulse: 79   SpO2: 98%     Constitutional: Well-developed well-nourished, no acute distress  Eyes: Conjunctiva normal, sclera nonicteric  ENMT: Hearing grossly normal, oral mucosa moist  Neck: Supple, no palpable mass, trachea midline  Respiratory: Breathing comfortable, normal inspiratory effort, bilateral chest expansion  Cardiovascular: Regular rate, no peripheral edema, no jugular venous distention  Breast: symmetric  Right: Right nipple inversion.  Unable to manually express any nipple discharge.  Otherwise, no visible abnormalities on inspection while seated, with arms raised or hands on hips. No masses, skin changes, or nipple abnormalities.  Left:  No visible abnormalities on inspection while seated, with arms raised or hands on hips. No masses, skin changes, or nipple abnormalities.  No clinical chest wall involvement.  Lymphatics (palpable nodes): No cervical, supraclavicular or axillary lymphadenopathy  Skin:  Warm, dry, no rash on visualized skin surfaces  Musculoskeletal: Symmetric strength, normal gait  Psychiatric: Alert and oriented ×3, normal affect         Tereza Miles PA-C    [facility] - General Surgery   [address], [address]  [place] [address]    [address]  [address]  [place] [address]    Office: [phone]  Fax: [phone]

## 2025-07-18 ENCOUNTER — LAB (OUTPATIENT)
Facility: HOSPITAL | Age: 70
End: 2025-07-18
Payer: MEDICARE

## 2025-07-18 ENCOUNTER — OFFICE VISIT (OUTPATIENT)
Dept: SURGERY | Facility: CLINIC | Age: 70
End: 2025-07-18
Payer: MEDICARE

## 2025-07-18 VITALS
OXYGEN SATURATION: 98 % | SYSTOLIC BLOOD PRESSURE: 128 MMHG | WEIGHT: 134 LBS | HEIGHT: 63 IN | HEART RATE: 79 BPM | BODY MASS INDEX: 23.74 KG/M2 | DIASTOLIC BLOOD PRESSURE: 70 MMHG

## 2025-07-18 DIAGNOSIS — N64.59 INVERSION OF NIPPLE: ICD-10-CM

## 2025-07-18 DIAGNOSIS — N64.52 NIPPLE DISCHARGE: Primary | ICD-10-CM

## 2025-07-18 DIAGNOSIS — N64.52 NIPPLE DISCHARGE: ICD-10-CM

## 2025-07-18 LAB — PROLACTIN SERPL-MCNC: 5.86 NG/ML (ref 4.79–23.3)

## 2025-07-18 PROCEDURE — 1159F MED LIST DOCD IN RCRD: CPT

## 2025-07-18 PROCEDURE — 99203 OFFICE O/P NEW LOW 30 MIN: CPT

## 2025-07-18 PROCEDURE — 84146 ASSAY OF PROLACTIN: CPT

## 2025-07-18 PROCEDURE — 36415 COLL VENOUS BLD VENIPUNCTURE: CPT

## 2025-07-18 PROCEDURE — 1160F RVW MEDS BY RX/DR IN RCRD: CPT

## 2025-08-13 ENCOUNTER — HOSPITAL ENCOUNTER (OUTPATIENT)
Dept: MRI IMAGING | Facility: HOSPITAL | Age: 70
Discharge: HOME | End: 2025-08-13
Payer: MEDICARE

## 2025-08-13 DIAGNOSIS — N64.52 NIPPLE DISCHARGE: ICD-10-CM

## 2025-08-13 DIAGNOSIS — N64.59 INVERSION OF NIPPLE: ICD-10-CM

## 2025-08-13 PROCEDURE — 25510000002 GADOBENATE DIMEGLUMINE 529 MG/ML SOLUTION

## 2025-08-13 PROCEDURE — A9577 INJ MULTIHANCE: HCPCS

## 2025-08-13 PROCEDURE — C8908 MRI W/O FOL W/CONT, BREAST,: HCPCS

## 2025-08-13 PROCEDURE — C8937 CAD BREAST MRI: HCPCS

## 2025-08-13 RX ADMIN — GADOBENATE DIMEGLUMINE 12 ML: 529 INJECTION, SOLUTION INTRAVENOUS at 10:12

## 2025-08-15 ENCOUNTER — HOSPITAL ENCOUNTER (OUTPATIENT)
Facility: HOSPITAL | Age: 70
Discharge: HOME | End: 2025-08-15
Payer: MEDICARE

## 2025-08-15 ENCOUNTER — OFFICE VISIT (OUTPATIENT)
Dept: OBSTETRICS AND GYNECOLOGY | Age: 70
End: 2025-08-15
Payer: COMMERCIAL

## 2025-08-15 VITALS — WEIGHT: 132.8 LBS | DIASTOLIC BLOOD PRESSURE: 82 MMHG | SYSTOLIC BLOOD PRESSURE: 112 MMHG | BODY MASS INDEX: 23.52 KG/M2

## 2025-08-15 DIAGNOSIS — M85.88 OTHER SPECIFIED DISORDERS OF BONE DENSITY AND STRUCTURE, OTHER SITE: ICD-10-CM

## 2025-08-15 DIAGNOSIS — A60.04 HERPES, VULVOVAGINITIS: ICD-10-CM

## 2025-08-15 DIAGNOSIS — N64.59 INVERSION OF RIGHT NIPPLE: Primary | ICD-10-CM

## 2025-08-15 PROCEDURE — 77080 DXA BONE DENSITY AXIAL: CPT

## 2025-08-18 ENCOUNTER — RESULTS FOLLOW-UP (OUTPATIENT)
Dept: SURGERY | Facility: CLINIC | Age: 70
End: 2025-08-18
Payer: MEDICARE

## 2025-08-18 DIAGNOSIS — R92.8 ABNORMAL FINDING ON BREAST IMAGING: Primary | ICD-10-CM

## 2025-08-18 DIAGNOSIS — K76.89 LIVER CYST: ICD-10-CM
